# Patient Record
Sex: FEMALE | Race: WHITE | URBAN - METROPOLITAN AREA
[De-identification: names, ages, dates, MRNs, and addresses within clinical notes are randomized per-mention and may not be internally consistent; named-entity substitution may affect disease eponyms.]

---

## 2018-11-12 ENCOUNTER — ANESTHESIA EVENT (OUTPATIENT)
Dept: ENDOSCOPY | Age: 28
End: 2018-11-12
Payer: COMMERCIAL

## 2018-11-29 ENCOUNTER — HOSPITAL ENCOUNTER (OUTPATIENT)
Age: 28
Setting detail: OUTPATIENT SURGERY
Discharge: HOME | End: 2018-11-29
Attending: INTERNAL MEDICINE | Admitting: INTERNAL MEDICINE
Payer: COMMERCIAL

## 2018-11-29 ENCOUNTER — ANESTHESIA (OUTPATIENT)
Dept: ENDOSCOPY | Age: 28
End: 2018-11-29
Payer: COMMERCIAL

## 2018-11-29 VITALS
HEIGHT: 64 IN | HEART RATE: 67 BPM | OXYGEN SATURATION: 100 % | TEMPERATURE: 97.4 F | RESPIRATION RATE: 16 BRPM | DIASTOLIC BLOOD PRESSURE: 65 MMHG | BODY MASS INDEX: 29.02 KG/M2 | SYSTOLIC BLOOD PRESSURE: 108 MMHG | WEIGHT: 170 LBS

## 2018-11-29 VITALS — OXYGEN SATURATION: 100 % | DIASTOLIC BLOOD PRESSURE: 60 MMHG | SYSTOLIC BLOOD PRESSURE: 109 MMHG

## 2018-11-29 LAB — HCG(URINE) PREGNANCY TEST: NEGATIVE

## 2018-11-29 PROCEDURE — 3609009500 HC COLONOSCOPY DIAGNOSTIC OR SCREENING: Performed by: INTERNAL MEDICINE

## 2018-11-29 PROCEDURE — 2580000003 HC RX 258: Performed by: NURSE ANESTHETIST, CERTIFIED REGISTERED

## 2018-11-29 PROCEDURE — 3700000001 HC ADD 15 MINUTES (ANESTHESIA): Performed by: INTERNAL MEDICINE

## 2018-11-29 PROCEDURE — 2709999900 HC NON-CHARGEABLE SUPPLY: Performed by: INTERNAL MEDICINE

## 2018-11-29 PROCEDURE — 7100000010 HC PHASE II RECOVERY - FIRST 15 MIN: Performed by: INTERNAL MEDICINE

## 2018-11-29 PROCEDURE — 6360000002 HC RX W HCPCS: Performed by: NURSE ANESTHETIST, CERTIFIED REGISTERED

## 2018-11-29 PROCEDURE — 2500000003 HC RX 250 WO HCPCS: Performed by: NURSE ANESTHETIST, CERTIFIED REGISTERED

## 2018-11-29 PROCEDURE — 3700000000 HC ANESTHESIA ATTENDED CARE: Performed by: INTERNAL MEDICINE

## 2018-11-29 PROCEDURE — 2580000003 HC RX 258: Performed by: FAMILY MEDICINE

## 2018-11-29 PROCEDURE — 84703 CHORIONIC GONADOTROPIN ASSAY: CPT

## 2018-11-29 PROCEDURE — 7100000011 HC PHASE II RECOVERY - ADDTL 15 MIN: Performed by: INTERNAL MEDICINE

## 2018-11-29 RX ORDER — PROPOFOL 10 MG/ML
INJECTION, EMULSION INTRAVENOUS PRN
Status: DISCONTINUED | OUTPATIENT
Start: 2018-11-29 | End: 2018-11-29 | Stop reason: SDUPTHER

## 2018-11-29 RX ORDER — SODIUM CHLORIDE 0.9 % (FLUSH) 0.9 %
10 SYRINGE (ML) INJECTION EVERY 12 HOURS SCHEDULED
Status: DISCONTINUED | OUTPATIENT
Start: 2018-11-29 | End: 2018-11-29 | Stop reason: HOSPADM

## 2018-11-29 RX ORDER — LIDOCAINE HYDROCHLORIDE 20 MG/ML
INJECTION, SOLUTION INFILTRATION; PERINEURAL PRN
Status: DISCONTINUED | OUTPATIENT
Start: 2018-11-29 | End: 2018-11-29 | Stop reason: SDUPTHER

## 2018-11-29 RX ORDER — SODIUM CHLORIDE 9 MG/ML
INJECTION, SOLUTION INTRAVENOUS CONTINUOUS
Status: DISCONTINUED | OUTPATIENT
Start: 2018-11-29 | End: 2018-11-29 | Stop reason: HOSPADM

## 2018-11-29 RX ORDER — SODIUM CHLORIDE 9 MG/ML
INJECTION, SOLUTION INTRAVENOUS CONTINUOUS PRN
Status: DISCONTINUED | OUTPATIENT
Start: 2018-11-29 | End: 2018-11-29 | Stop reason: SDUPTHER

## 2018-11-29 RX ORDER — SODIUM CHLORIDE 0.9 % (FLUSH) 0.9 %
10 SYRINGE (ML) INJECTION PRN
Status: DISCONTINUED | OUTPATIENT
Start: 2018-11-29 | End: 2018-11-29 | Stop reason: HOSPADM

## 2018-11-29 RX ADMIN — SODIUM CHLORIDE: 9 INJECTION, SOLUTION INTRAVENOUS at 08:48

## 2018-11-29 RX ADMIN — PROPOFOL 30 MG: 10 INJECTION, EMULSION INTRAVENOUS at 09:14

## 2018-11-29 RX ADMIN — PROPOFOL 30 MG: 10 INJECTION, EMULSION INTRAVENOUS at 09:12

## 2018-11-29 RX ADMIN — PROPOFOL 20 MG: 10 INJECTION, EMULSION INTRAVENOUS at 09:19

## 2018-11-29 RX ADMIN — PROPOFOL 20 MG: 10 INJECTION, EMULSION INTRAVENOUS at 09:09

## 2018-11-29 RX ADMIN — SODIUM CHLORIDE: 9 INJECTION, SOLUTION INTRAVENOUS at 09:00

## 2018-11-29 RX ADMIN — PROPOFOL 20 MG: 10 INJECTION, EMULSION INTRAVENOUS at 09:15

## 2018-11-29 RX ADMIN — PROPOFOL 30 MG: 10 INJECTION, EMULSION INTRAVENOUS at 09:10

## 2018-11-29 RX ADMIN — PROPOFOL 30 MG: 10 INJECTION, EMULSION INTRAVENOUS at 09:17

## 2018-11-29 RX ADMIN — LIDOCAINE HYDROCHLORIDE 100 MG: 20 INJECTION, SOLUTION INFILTRATION; PERINEURAL at 09:09

## 2018-11-29 ASSESSMENT — PAIN - FUNCTIONAL ASSESSMENT: PAIN_FUNCTIONAL_ASSESSMENT: 0-10

## 2018-11-29 ASSESSMENT — PAIN SCALES - GENERAL
PAINLEVEL_OUTOF10: 0

## 2018-11-29 NOTE — ANESTHESIA POSTPROCEDURE EVALUATION
Department of Anesthesiology  Postprocedure Note    Patient: Chris Rios  MRN: [id number]  YOB: 1990  Date of evaluation: 11/29/2018  Time:  2:49 PM     Procedure Summary     Date:  11/29/18 Room / Location:  [facility] ENDO 03 / [facility] ENDOSCOPY    Anesthesia Start:  9887 Anesthesia Stop:  5460    Procedure:  COLONOSCOPY (N/A ) Diagnosis:  (HISTORY OF RECTAL CANCER Z85.048)    Surgeon:  Sophia Gill MD Responsible Provider:  Ella Monson MD    Anesthesia Type:  MAC ASA Status:  1          Anesthesia Type: MAC    Villa Phase I: Villa Score: 10    Villa Phase II: Villa Score: 10    Last vitals: Reviewed and per EMR flowsheets.        Anesthesia Post Evaluation    Patient location during evaluation: PACU  Complications: no  Cardiovascular status: hemodynamically stable  Respiratory status: acceptable

## 2018-11-29 NOTE — PROGRESS NOTES
Reviewed patient's medical and surgical history in electronic record and with patient at the bedside. All questions regarding procedure answered. Scope number and equipment verified using a two person system. Family in waiting room.     Electronically signed by Rosetta Coyne RN on 11/29/2018 at 9:07 AM

## 2021-07-02 ENCOUNTER — HOSPITAL ENCOUNTER (OUTPATIENT)
Age: 31
Discharge: HOME | End: 2021-07-02
Payer: COMMERCIAL

## 2021-07-02 ENCOUNTER — HOSPITAL ENCOUNTER (OUTPATIENT)
Dept: GENERAL RADIOLOGY | Age: 31
Discharge: HOME | End: 2021-07-02
Payer: COMMERCIAL

## 2021-07-02 DIAGNOSIS — R52 PAIN: ICD-10-CM

## 2021-07-02 PROCEDURE — 73140 X-RAY EXAM OF FINGER(S): CPT

## 2023-01-31 ENCOUNTER — ANESTHESIA EVENT (OUTPATIENT)
Dept: ENDOSCOPY | Age: 33
End: 2023-01-31
Payer: COMMERCIAL

## 2023-01-31 NOTE — PROGRESS NOTES
ENDOSCOPY PREOP INSTRUCTIONS      You are scheduled for a procedure at [facility] on 2-1 @ 815. You will need to arrival by: [phone] (at least an hour & a half prior to planned start time)  Report to the MAIN entrance on [address] and register at the surgery center on the left-hand side of the lobby  You will need your insurance card and photo id and a list of all medications taken on a regular basis. Please include the dose/frequency. For your procedure:     PLEASE FOLLOW ALL INSTRUCTIONS & PREPS GIVEN TO YOU BY YOUR DOCTOR'S OFFICE. If you have not received these instructions yet, please call the office immediately. Make sure to read them as soon as received. If you are taking blood thinners, Aspirin or diabetic medication, make sure to call your doctor as soon as possible for instructions prior to your procedure. Please dress comfortably and do not wear any lotion, powders or jewelry  Arrange for someone to be with you and sign you out & drive you home after your procedure. THIS PERSON MUST WAIT AT HOSPITAL THE ENTIRE TIME. We allow 2 adult visitors with you in the hospital & masks are strongly recommended.     WOMEN ONLY OF CHILDBEARING AGE: Please make sure to be able to give a urine sample on arrival      If you have further questions, you may contact your Endoscopist's office or Pre Admission Testing staff at [phone]

## 2023-02-01 ENCOUNTER — ANESTHESIA (OUTPATIENT)
Dept: ENDOSCOPY | Age: 33
End: 2023-02-01
Payer: COMMERCIAL

## 2023-02-01 ENCOUNTER — HOSPITAL ENCOUNTER (OUTPATIENT)
Age: 33
Setting detail: OUTPATIENT SURGERY
Discharge: HOME | End: 2023-02-01
Attending: INTERNAL MEDICINE | Admitting: INTERNAL MEDICINE
Payer: COMMERCIAL

## 2023-02-01 VITALS
OXYGEN SATURATION: 100 % | TEMPERATURE: 98.5 F | WEIGHT: 205 LBS | SYSTOLIC BLOOD PRESSURE: 104 MMHG | HEART RATE: 71 BPM | BODY MASS INDEX: 34.16 KG/M2 | RESPIRATION RATE: 18 BRPM | HEIGHT: 65 IN | DIASTOLIC BLOOD PRESSURE: 67 MMHG

## 2023-02-01 DIAGNOSIS — Z85.038 PERSONAL HISTORY OF COLON CANCER: ICD-10-CM

## 2023-02-01 DIAGNOSIS — K62.5 RECTAL BLEED: ICD-10-CM

## 2023-02-01 LAB — PREGNANCY, URINE: NEGATIVE

## 2023-02-01 PROCEDURE — 7100000010 HC PHASE II RECOVERY - FIRST 15 MIN: Performed by: INTERNAL MEDICINE

## 2023-02-01 PROCEDURE — 2580000003 HC RX 258: Performed by: ANESTHESIOLOGY

## 2023-02-01 PROCEDURE — 2709999900 HC NON-CHARGEABLE SUPPLY: Performed by: INTERNAL MEDICINE

## 2023-02-01 PROCEDURE — 3609010600 HC COLONOSCOPY POLYPECTOMY SNARE/COLD BIOPSY: Performed by: INTERNAL MEDICINE

## 2023-02-01 PROCEDURE — 2500000003 HC RX 250 WO HCPCS: Performed by: NURSE ANESTHETIST, CERTIFIED REGISTERED

## 2023-02-01 PROCEDURE — 3700000000 HC ANESTHESIA ATTENDED CARE: Performed by: INTERNAL MEDICINE

## 2023-02-01 PROCEDURE — 88305 TISSUE EXAM BY PATHOLOGIST: CPT

## 2023-02-01 PROCEDURE — 6360000002 HC RX W HCPCS: Performed by: NURSE ANESTHETIST, CERTIFIED REGISTERED

## 2023-02-01 PROCEDURE — 7100000011 HC PHASE II RECOVERY - ADDTL 15 MIN: Performed by: INTERNAL MEDICINE

## 2023-02-01 PROCEDURE — 3700000001 HC ADD 15 MINUTES (ANESTHESIA): Performed by: INTERNAL MEDICINE

## 2023-02-01 PROCEDURE — 84703 CHORIONIC GONADOTROPIN ASSAY: CPT

## 2023-02-01 RX ORDER — PROPOFOL 10 MG/ML
INJECTION, EMULSION INTRAVENOUS CONTINUOUS PRN
Status: DISCONTINUED | OUTPATIENT
Start: 2023-02-01 | End: 2023-02-01 | Stop reason: SDUPTHER

## 2023-02-01 RX ORDER — LIDOCAINE HYDROCHLORIDE 20 MG/ML
INJECTION, SOLUTION INFILTRATION; PERINEURAL PRN
Status: DISCONTINUED | OUTPATIENT
Start: 2023-02-01 | End: 2023-02-01 | Stop reason: SDUPTHER

## 2023-02-01 RX ORDER — SODIUM CHLORIDE, SODIUM LACTATE, POTASSIUM CHLORIDE, CALCIUM CHLORIDE 600; 310; 30; 20 MG/100ML; MG/100ML; MG/100ML; MG/100ML
INJECTION, SOLUTION INTRAVENOUS CONTINUOUS
Status: DISCONTINUED | OUTPATIENT
Start: 2023-02-01 | End: 2023-02-01 | Stop reason: HOSPADM

## 2023-02-01 RX ORDER — PROPOFOL 10 MG/ML
INJECTION, EMULSION INTRAVENOUS PRN
Status: DISCONTINUED | OUTPATIENT
Start: 2023-02-01 | End: 2023-02-01 | Stop reason: SDUPTHER

## 2023-02-01 RX ADMIN — LIDOCAINE HYDROCHLORIDE 100 MG: 20 INJECTION, SOLUTION INFILTRATION; PERINEURAL at 08:34

## 2023-02-01 RX ADMIN — SODIUM CHLORIDE, SODIUM LACTATE, POTASSIUM CHLORIDE, AND CALCIUM CHLORIDE: .6; .31; .03; .02 INJECTION, SOLUTION INTRAVENOUS at 08:03

## 2023-02-01 RX ADMIN — PROPOFOL 50 MG: 10 INJECTION, EMULSION INTRAVENOUS at 08:35

## 2023-02-01 RX ADMIN — PROPOFOL 200 MCG/KG/MIN: 10 INJECTION, EMULSION INTRAVENOUS at 08:35

## 2023-02-01 RX ADMIN — PROPOFOL 50 MG: 10 INJECTION, EMULSION INTRAVENOUS at 08:34

## 2023-02-01 ASSESSMENT — PAIN - FUNCTIONAL ASSESSMENT: PAIN_FUNCTIONAL_ASSESSMENT: 0-10

## 2023-02-01 ASSESSMENT — PAIN SCALES - GENERAL
PAINLEVEL_OUTOF10: 0
PAINLEVEL_OUTOF10: 0

## 2023-02-01 NOTE — H&P
Pre-operative History and Physical    Patient: Henrietta Calderon  : 1990  Acct#:     History Obtained From:  patient    HISTORY OF PRESENT ILLNESS:    The patient is a 29 y.o. female  who presents with previous colon cancer    Past Medical History:        Diagnosis Date    Constipation     Herpes     Panic attack     Rectal cancer ([facility] [address]      Past Surgical History:        Procedure Laterality Date     SECTION      COLECTOMY      MI COLONOSCOPY FLX DX W/COLLJ SPEC WHEN PFRMD N/A 2018    COLONOSCOPY performed by Skip Gastelum MD at [address] [place] [address]     Medications Prior to Admission:   No current facility-administered medications on file prior to encounter. Current Outpatient Medications on File Prior to Encounter   Medication Sig Dispense Refill    ValACYclovir HCl (VALTREX PO) Take by mouth daily (Patient not taking: Reported on 2023)          Allergies:  Patient has no known allergies. History of allergic reaction to anesthesia:  No    PHYSICAL EXAM:      /68   Pulse 84   Temp 97.7 °F (36.5 °C) (Temporal)   Resp 15   Ht 5' 4.5\" (1.638 m)   Wt 205 lb (93 kg)   LMP 2023   SpO2 98%   BMI 34.64 kg/m²  I        Heart:  Normal apical impulse, regular rate and rhythm, normal S1 and S2, no S3 or S4, and no murmur noted    Lungs:  No increased work of breathing, good air exchange, clear to auscultation bilaterally, no crackles or wheezing    Abdomen:  No scars, normal bowel sounds, soft, non-distended, non-tender, no masses palpated, no hepatosplenomegally      ASA Grade:  ASA 2 - Patient with mild systemic disease with no functional limitations      ASSESSMENT AND PLAN:    1. Patient is a 29 y.o. female here for colonoscopy with deep sedation  2. Procedure options, risks and benefits reviewed with patient. Patient expresses understanding.             Dorys Mason MD  [address]  [phone]

## 2023-02-01 NOTE — PROGRESS NOTES
Ambulatory Surgery/Procedure Discharge Note    Vitals:    02/01/23 0909   BP: 104/67   Pulse: 71   Resp: 18   Temp:    SpO2: 100%       In: 400 [I.V.:400]  Out: -     Restroom use offered before discharge. Yes    Pain assessment:  none  Pain Level: 0        Patient discharged to home/self care.  Patient discharged via wheel chair by transporter to waiting family/S.O.       2/1/2023 9:30 AM

## 2023-02-01 NOTE — PROCEDURES
Colonoscopy Procedure Note      Patient: Jacques Manrique  : 1990  Acct#:     Procedure: Colonoscopy with polypectomy (cold snare)    Date:  2023    Surgeon:  Charles Ramirez MD,     Referring Physician:  Victoria Wiley      Preoperative Diagnosis:    H/O rectosigmoid adenocarcinoma in 2012, s/p LAR, T1N0M0  For surveillance colonoscopy today. She has history of occasional blood in stool and history of constipation  As per her, She has had genetic testing/counseling which was negative      Consent:  The patient or their legal guardian has signed a consent, and is aware of the potential risks, benefits, alternatives, and potential complications of this procedure. These include, but are not limited to hemorrhage, bleeding, post procedural pain, perforation, phlebitis, aspiration, hypotension, hypoxia, cardiovascular events such as arryhthmia, and possibly death. Additionally, the possibility of missed colonic polyps and interval colon cancer was discussed in the consent. Anesthesia: MAC anesthesia        Procedure description:   An informed consent was obtained from the patient after explanation of indications, benefits, possible risks and complications of the procedure. The patient was then taken to the endoscopy suite, placed in the left lateral decubitus position, and the above IV anesthesia was administered. A digital rectal examination was performed and revealed negative without mass, lesions or tenderness. The Olympus video colonoscope was placed in the patient's rectum under digital direction and advanced to the cecum. The cecum was identified by IC valve and appendiceal orifice. The prep was good. The ileocecal valve was identified and  intubated. The scope was then withdrawn back through the cecum, ascending, transverse, descending and sigmoid colons. Careful circumferential examination of the mucosa was performed.  The scope was then withdrawn into the rectum and retroflexed. The scope was straightened, the colon was decompressed and the scope was withdrawn from the patient. The patient tolerated the procedure well and was taken to the recovery room in good condition. Complications: none  EBL: None    Findings:  Visualization of the terminal ileum demonstrated normal mucosa. The mucosa in cecum, ascending colon, transverse colon, descending colon, sigmoid colon and rectum was normal.  5 mm polyp was seen at the colorectal anastomosis site, at 10 cm from the anal verge, removed by cold snare polypectomy. Retroflexion in the rectum revealed small internal hemorrhoids. Impression:    5 mm polyp was seen at the colorectal anastomosis site, at 10 cm from the anal verge, removed by cold snare polypectomy. Normal mucosa seen otherwise throughout the exam up to terminal ileum      Recommendations: Follow-up pathology results  Recommend surveillance colonoscopy in 3 years, if the pathology is benign  Occasional blood in stool most likely secondary to small hemorrhoids.   Recommend Metamucil/fiber supplements daily and MiraLAX daily as needed for constipation  Follow-up in the office as needed        Edward Pettit, [address] [place] [address]  [phone]    2/1/2023

## 2023-02-01 NOTE — ANESTHESIA POSTPROCEDURE EVALUATION
Department of Anesthesiology  Postprocedure Note    Patient: Gamal Chaves  MRN: [id number]  YOB: 1990  Date of evaluation: 2/1/2023      Procedure Summary     Date: 02/01/23 Room / Location: [address] [place] [address]    Anesthesia Start: [address] Anesthesia Stop: 1785    Procedure: COLONOSCOPY POLYPECTOMY SNARE/COLD BIOPSY Diagnosis:       Rectal bleed      Personal history of colon cancer      (Rectal bleed [K62.5])      (Personal history of colon cancer [Z85.038])    Surgeons: Eliane Centeno MD Responsible Provider: Yaneli Kolb MD    Anesthesia Type: MAC ASA Status: 3          Anesthesia Type: No value filed.     Villa Phase I: Villa Score: 10    Villa Phase II: Villa Score: 10      Anesthesia Post Evaluation    Patient location during evaluation: PACU  Patient participation: complete - patient participated  Level of consciousness: awake  Pain score: 0  Nausea & Vomiting: no nausea and no vomiting  Complications: no  Cardiovascular status: blood pressure returned to baseline  Respiratory status: acceptable  Hydration status: euvolemic

## 2023-02-01 NOTE — DISCHARGE INSTRUCTIONS
ENDOSCOPY DISCHARGE INSTRUCTIONS:    Call the physician that did your procedure for any questions or concern:    [facility]: [phone]  DR. SHEN [facility]        ACTIVITY:    There are potential side effects to the medications used for sedation and anesthesia during your procedure. These include:  Dizziness or light-headedness, confusion or memory loss, delayed reaction times, loss of coordination, nausea and vomiting. Because of your increased risk for injury, we ask that you observe the following precautions: For the next 24 hours,  DO NOT operate an automobile, bicycle, motorcycle, , power tools or large equipment of any kind. Do not drink alcohol, sign any legal documents or make any legal decisions for 24 hours. Do not bend your head over lower than your heart. DO sit on the side of bed/couch awhile before getting up. Plan on bedrest or quiet relaxation today. You may resume normal activities in 24 hours. DIET:    Your first meal today should be light, avoiding spicy and fatty foods. If you tolerate this first meal, then you may advance to your regular diet unless otherwise advised by your physician. NORMAL SYMPTOMS:  -Mild sore throat if youve had an EGD   -Gaseous discomfort    NOTIFY YOUR PHYSICIAN IF THESE SYMPTOMS OCCUR:  1. Fever (greater than 100)  5. Increased abdominal bloating  2. Severe pain    6. Excessive bleeding  3. Nausea and vomiting  7. Chest pain                                                                    4. Chills    8. Shortness of breath    ADDITIONAL INSTRUCTIONS:    Biopsy results: Call [address],[facility] for biopsy results in 1 week    Educational Information:         Colon Polyps: Care Instructions  Your Care Instructions     Colon polyps are growths in the colon or the rectum. The cause of most colon polyps is not known, and most people who get them do not have any problems. But a certain kind can turn into cancer.  For this reason, regular testing for colon polyps is important for people as they get older. It is also important for anyone who has an increased risk for colon cancer. Polyps are usually found through routine colon cancer screening tests. Although most colon polyps are not cancerous, they are usually removed and then tested for cancer. Screening for colon cancer saves lives because the cancer can usually be cured if it is caught early. If you have a polyp that is the type that can turn into cancer, you may need more tests to examine your entire colon. The doctor will remove any other polyps that are found, and you will be tested more often. Follow-up care is a key part of your treatment and safety. Be sure to make and go to all appointments, and call your doctor if you are having problems. It's also a good idea to know your test results and keep a list of the medicines you take. How can you care for yourself at home? Regular exams to look for colon polyps are the best way to prevent polyps from turning into colon cancer. These can include stool tests, sigmoidoscopy, colonoscopy, and CT colonography. Talk with your doctor about a testing schedule that is right for you. To prevent polyps  There is no home treatment that can prevent colon polyps. But these steps may help lower your risk for cancer. Stay active. Being active can help you get to and stay at a healthy weight. Try to exercise on most days of the week. Walking is a good choice. Eat well. Choose a variety of vegetables, fruits, legumes (such as peas and beans), fish, poultry, and whole grains. Do not smoke. If you need help quitting, talk to your doctor about stop-smoking programs and medicines. These can increase your chances of quitting for good. If you drink alcohol, limit how much you drink. Limit alcohol to 2 drinks a day for men and 1 drink a day for women. When should you call for help?    Call your doctor now or seek immediate medical care if:    You have severe belly pain.     Your stools are maroon or very bloody. Watch closely for changes in your health, and be sure to contact your doctor if:    You have a fever. You have nausea or vomiting. You have a change in bowel habits (new constipation or diarrhea). Your symptoms get worse or are not improving as expected. Where can you learn more? Go to http://www.woods.com/ and enter C571 to learn more about \"Colon Polyps: Care Instructions. \"  Current as of: June 6, 2022               Content Version: 13.5  © 6198-9284 [employer]. Care instructions adapted under license by [facility] [address] If you have questions about a medical condition or this instruction, always ask your healthcare professional. [place] [address] any warranty or liability for your use of this information. Impression:    5 mm polyp was seen at the colorectal anastomosis site, at 10 cm from the anal verge, removed by cold snare polypectomy. Normal mucosa seen otherwise throughout the exam up to terminal ileum        Recommendations: Follow-up pathology results  Recommend surveillance colonoscopy in 3 years, if the pathology is benign  Occasional blood in stool most likely secondary to small hemorrhoids. Recommend Metamucil/fiber supplements daily and MiraLAX daily as needed for constipation  Follow-up in the office as needed      Please review these discharge instructions this evening or tomorrow for  information you may have forgotten. We want to thank you for choosing the [facility] as your health care provider. We always strive to provide you with excellent care while you are here. You may receive a survey in the mail regarding your care. We would appreciate you taking a few minutes of your time to complete this survey.

## 2023-02-01 NOTE — ANESTHESIA PRE PROCEDURE
Department of Anesthesiology  Preprocedure Note       Name:  Juan Zheng   Age:  29 y.o.  :  1990                                          MRN:  [id number]         Date:  2023      Surgeon: Fatuma Allen):  Gavin Pacheco MD    Procedure: Procedure(s):  COLONOSCOPY    Medications prior to admission:   Prior to Admission medications    Medication Sig Start Date End Date Taking? Authorizing Provider   ValACYclovir HCl (VALTREX PO) Take by mouth daily  Patient not taking: Reported on 2023    Historical Provider, MD       Current medications:    Current Facility-Administered Medications   Medication Dose Route Frequency Provider Last Rate Last Admin    lactated ringers IV soln infusion   IntraVENous Continuous Brian Toscano MD           Allergies:  No Known Allergies    Problem List:  There is no problem list on file for this patient. Past Medical History:        Diagnosis Date    Constipation     Herpes     Panic attack     Rectal cancer [facility])        Past Surgical History:        Procedure Laterality Date     SECTION      COLECTOMY      [place] COLONOSCOPY FLX DX W/COLLJ SPEC WHEN PFRMD N/A 2018    COLONOSCOPY performed by Ponce Garcia MD at [address] [facility] History:    Social History     Tobacco Use    Smoking status: Never    Smokeless tobacco: Never   Substance Use Topics    Alcohol use:  No                                Counseling given: Not Answered      Vital Signs (Current):   Vitals:    23 0706   BP: 117/68   Pulse: 84   Resp: 15   Temp: 97.7 °F (36.5 °C)   TempSrc: Temporal   SpO2: 98%   Weight: 205 lb (93 kg)   Height: 5' 4.5\" (1.638 m)                                              BP Readings from Last 3 Encounters:   23 117/68   18 109/60   18 108/65       NPO Status: Time of last liquid consumption: 0200                        Time of last solid consumption: 2300                        Date of last liquid consumption: 02/01/23                        Date of last solid food consumption: 01/30/23    BMI:   Wt Readings from Last 3 Encounters:   02/01/23 205 lb (93 kg)   11/29/18 170 lb (77.1 kg)   04/09/16 195 lb (88.5 kg)     Body mass index is 34.64 kg/m². CBC:   Lab Results   Component Value Date/Time    WBC 6.6 10/11/2010 12:06 PM    RBC 4.50 10/11/2010 12:06 PM    HGB 12.6 09/16/2011 03:49 PM    HGB 13.1 10/11/2010 12:06 PM    HCT 37 09/16/2011 03:49 PM    HCT 38.5 10/11/2010 12:06 PM    MCV 85.6 10/11/2010 12:06 PM    RDW 13.1 10/11/2010 12:06 PM     10/11/2010 12:06 PM       CMP:   Lab Results   Component Value Date/Time     10/11/2010 12:06 PM    K 4.1 10/11/2010 12:06 PM     10/11/2010 12:06 PM    CO2 28 10/11/2010 12:06 PM    BUN 10 10/11/2010 12:06 PM    CREATININE 0.6 10/11/2010 12:06 PM    GFRAA >60 10/11/2010 12:06 PM    AGRATIO 1.9 10/11/2010 12:06 PM    GLUCOSE 86 10/11/2010 12:06 PM    PROT 6.9 10/11/2010 12:06 PM    CALCIUM 9.4 10/11/2010 12:06 PM    BILITOT 0.80 10/11/2010 12:06 PM    ALKPHOS 81 10/11/2010 12:06 PM    AST 25 10/11/2010 12:06 PM    ALT 31 10/11/2010 12:06 PM       POC Tests: No results for input(s): POCGLU, POCNA, POCK, POCCL, POCBUN, POCHEMO, POCHCT in the last 72 hours.     Coags: No results found for: PROTIME, INR, APTT    HCG (If Applicable):   Lab Results   Component Value Date    PREGTESTUR Negative 02/01/2023        ABGs: No results found for: PHART, PO2ART, ISA4TGM, NSX1QLD, BEART, C1IEVTDZ     Type & Screen (If Applicable):  No results found for: LABABO, LABRH    Drug/Infectious Status (If Applicable):  No results found for: HIV, HEPCAB    COVID-19 Screening (If Applicable): No results found for: COVID19        Anesthesia Evaluation  Patient summary reviewed and Nursing notes reviewed  Airway: Mallampati: II  TM distance: >3 FB   Neck ROM: full  Mouth opening: > = 3 FB   Dental:          Pulmonary: breath sounds clear to auscultation Cardiovascular:            Rhythm: regular  Rate: normal                    Neuro/Psych:               GI/Hepatic/Renal:            ROS comment: Obesity, rectal CA. Endo/Other:                      ROS comment: Ankle fracture Abdominal:   (+) obese,           Vascular: Other Findings:           Anesthesia Plan      MAC     ASA 3       Induction: intravenous. Anesthetic plan and risks discussed with patient. Use of blood products discussed with patient whom. Plan discussed with CRNA.                     Chidi Tee MD   2/1/2023

## 2023-03-07 ENCOUNTER — HOSPITAL ENCOUNTER (OUTPATIENT)
Dept: PHYSICAL THERAPY | Age: 33
Setting detail: THERAPIES SERIES
Discharge: HOME | End: 2023-03-07
Payer: COMMERCIAL

## 2023-03-07 PROCEDURE — 97530 THERAPEUTIC ACTIVITIES: CPT

## 2023-03-07 PROCEDURE — 97161 PT EVAL LOW COMPLEX 20 MIN: CPT

## 2023-03-07 PROCEDURE — 97110 THERAPEUTIC EXERCISES: CPT

## 2023-03-07 NOTE — PLAN OF CARE
[address] [address], [address] [place] [address] Drive  Phone: [phone]   Fax: [phone]                                                       Physical Therapy Certification    Dear Gregory Sánchez MD  ,    We had the pleasure of evaluating the following patient for physical therapy services at [address] [place] [address]. A summary of our findings can be found in the initial assessment below. This includes our plan of care. If you have any questions or concerns regarding these findings, please do not hesitate to contact me at the office phone number checked above. Thank you for the referral.       Physician Signature:_______________________________Date:__________________  By signing above (or electronic signature), therapists plan is approved by physician      Patient: Sunitha Doyle   : 1990   MRN: [id number]  Referring Physician: Gregory Sánchez MD        Evaluation Date: 3/7/2023      Medical Diagnosis Information:  F66.166J (ICD-10-CM) - Displaced trimalleolar fracture of right lower leg, subsequent encounter for open fracture type I or II with routine healing   PT diagnosis: decreased R ankle ROM, strength, antalgic gait                                      Insurance information: PT Insurance Information: [employer]     Precautions/ Contra-indications: n/a  Latex Allergy:  [x]NO      []YES  Preferred Language for Healthcare:   [x]English       []Other:    C-SSRS Triggered by Intake questionnaire (Past 2 wk assessment ):   [x] No, Questionnaire did not trigger screening.   [] Yes, Patient intake triggered C-SSRS Screening     [] Completed, no further action required. [] Completed, PCP notified via Epic    SUBJECTIVE: Patient stated complaint:pt was walking into her aunt's house on [address]  She was in the driveway but had to step in between driveway and grass and states the rest is a blur. Pt had surgery on 1/2/23. Since surgery, she has had PT. In Feb she had therapy, but decided to transfer here. Two weeks ago at her last appt she started putting weight through her R foot. She has only put shoe on once at PT, not wearing it at home. Pt has increased pain when wearing shoe compared to boot. Dropped to one crutch last week. Pt able to walk around at home more now that she has less swelling and less fear about a re-fracture. Pt went into basement for the first time. Pt went to the mall yesterday, but had to take seated rest breaks because of pain. Has a lot of night pain when she walks more. Has been taking OTC pain meds at night d/t pain. Pt used to be in the gym to workout a lot about a year ago but has not been since the injury.      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   Currently in nursing school - was going to graduate in July but is not attending this current semester  May not return to [employer] (formerly [employer]), but will go back to school in May at "[employer] where she has clinicals    Living Status/Prior Level of Function:Prior to this injury / incident, pt was independent with ADLs and IADLs,       OBJECTIVE:   Palpation: generally tender to palpation and towards available end range    Functional Mobility/Transfers: mod IND    Posture: WFL    Bandages/Dressings/Incisions: incisions are clean, dry, and healed    Gait: (include devices/WB status) w/ aircast boot and LUE crutch    Dermatomes Normal Abnormal Comments   inguinal area (L1)       anterior mid-thigh (L2)      distal ant thigh/med knee (L3)      medial lower leg and foot (L4)      lateral lower leg and foot (L5)      posterior calf (S1)      medial calcaneus (S2)          Reflexes Normal Abnormal Comments   S1-2 Seated achilles      S1-2 Prone knee bend      L3-4 Patellar tendon      Clonus      Babinski        Lumbar AROM screen: [x] [facility]  [] abnormal:     PROM AROM    L R L R   Hip Flexion       Hip Abduction       Hip ER       Hip IR       Knee Flexion       Knee Extension       Dorsiflexion    1 -3   Plantarflexion    61 50   Inversion    33 30   Eversion    20 8       Strength (0-5) / Myotomes Left Right   Hip Flexion - supine     Hip Flexion - seated (L1-2) 5 5   Hip Abduction     Hip Adduction     Hip ER     Hip IR     Quads (L2-4) 5 5   Hamstrings 5 4+**   Ankle Dorsiflexion (L4-5) 5 4   Ankle Plantarflexion (S1-2)  SL HR 5  20 4  NT   Ankle Inversion 5 4**   Ankle Eversion (S1-2) 5 4-**   Great Toe Extension (L5)          Flexibility     Hamstrings (90/90)     ITB (Efrain)     Quads (Ely's)     Hip Flexor (Roddy)          Girth (cm)     Mid patella     Suprapatellar     Figure 8 51.8 54.5   Transmalleolar 25.5 27.5   Metatarsal Heads         Joint mobility: R ankle   []Normal    [x]Hypo   []Hyper    Orthopaedic Special Tests - NT d/t sx Positive  Negative  NT Comments    Hip       SAE / Hero's       FADIR       Scour       Trendelenburg              Knee       Lachman's / Anterior Drawer       Posterior Drawer       Varus Stress       Valgus Stress       Bautista's        Appley's       Thessaly's       Patellar Tracking              Ankle       Anterior Drawer       Talar Tilt       Moore       Chey's                   Balance:   SLS: barefoot  L: 32 seconds  R: NT                         [x] Patient history, allergies, meds reviewed. Medical chart reviewed. See intake form.      Review Of Systems (ROS):  [x]Performed Review of systems (Integumentary, CardioPulmonary, Neurological) by intake and observation. Intake form has been scanned into medical record. Patient has been instructed to contact their primary care physician regarding ROS issues if not already being addressed at this time.       Co-morbidities/Complexities (which will affect course of rehabilitation):   []None        [x]Hx of COVID   Arthritic conditions   []Rheumatoid arthritis (M05.9)  []Osteoarthritis (M19.91)  []Gout   Cardiovascular conditions   []Hypertension (I10)  []Hyperlipidemia (E78.5)  []Angina pectoris (I20)  []Atherosclerosis (I70)  []Pacemaker  []Hx of CABG/stent/  cardiac surgeries   Musculoskeletal conditions   []Disc pathology   []Congenital spine pathologies   []Osteoporosis (M81.8)  []Osteopenia (M85.8)  []Scoliosis       Endocrine conditions   []Hypothyroid (E03.9)  []Hyperthyroid Gastrointestinal conditions   []Constipation (V91.20)   Metabolic conditions   []Morbid obesity (E66.01)  []Diabetes type 1(E10.65) or 2 (E11.65)   []Neuropathy (G60.9)     Cardio/Pulmonary conditions   []Asthma (J45)  []Coughing   []COPD (J44.9)  []CHF  []A-fib   Psychological Disorders  [x]Anxiety (F41.9)  []Depression (F32.9)   []Other:   Developmental Disorders  []Autism (F84.0)  []CP (G80)  []Down Syndrome (Q90.9)  []Developmental delay     Neurological conditions  []Prior Stroke (I69.30)  []Parkinson's (G20)  []Encephalopathy (G93.40)  []MS (G35)  []Post-polio (G14)  []SCI  []TBI  []ALS Other conditions  []Fibromyalgia (M79.7)  []Vertigo  []Syncope  []Kidney Failure  [x]Cancer      []currently undergoing                treatment  []Pregnancy  []Incontinence   Prior surgeries  []involved limb  []previous spinal surgery  [] section birth  []hysterectomy  []bowel / bladder surgery  []other relevant surgeries   []Other:              Barriers to/and or personal factors that will affect rehab potential:              []Age  []Sex    []Smoker              []Motivation/Lack of Motivation                        []Co-Morbidities []Cognitive Function, education/learning barriers              []Environmental, home barriers              [x]profession/work barriers  []past PT/medical experience  []other:  Justification:     Falls Risk Assessment (30 days):   [] Falls Risk assessed and no intervention required. [x] Falls Risk assessed and Patient requires intervention due to being higher risk   TUG score (>12s at risk):     [] Falls education provided, including        ASSESSMENT: Pt is a 28 y/o female s/p R ankle ORIF for trimalleolar fracture. Pt was seen post-op at another OP therapy clinic for about a month and then presents here. Pt with increased pain when ambulating without boot. Pt has been using crutch on LUE. Pt with decreased L ankle ROM, strength, balance, and endurance. Pt to benefit from further therapy to address deficits and restore function in R ankle to PLOF.    Functional Impairments:     [x]Noted lumbar/proximal hip/LE joint hypomobility   [x]Decreased LE functional ROM   [x]Decreased core/proximal hip strength and neuromuscular control   [x]Decreased LE functional strength   [x]Reduced balance/proprioceptive control   []other:      Functional Activity Limitations (from functional questionnaire and intake)   [x]Reduced ability to tolerate prolonged functional positions   [x]Reduced ability or difficulty with changes of positions or transfers between positions   [x]Reduced ability to maintain good posture and demonstrate good body mechanics with sitting, bending, and lifting   []Reduced ability to sleep   [x] Reduced ability or tolerance with driving and/or computer work   [x]Reduced ability to perform lifting, carrying tasks   [x]Reduced ability to squat   [x]Reduced ability to forward bend   [x]Reduced ability to ambulate prolonged functional periods/distances/surfaces   [x]Reduced ability to ascend/descend stairs   [x]Reduced ability to run, hop, cut or jump   []other:    Participation Restrictions   [x]Reduced participation in self care activities   [x]Reduced participation in home management activities   [x]Reduced participation in work activities   [x]Reduced participation in social activities. []Reduced participation in sport/recreation activities. Classification :    [x]Signs/symptoms consistent with post-surgical status including decreased ROM, strength and function.    []Signs/symptoms consistent with joint sprain/strain   []Signs/symptoms consistent with patella-femoral syndrome   []Signs/symptoms consistent with knee OA/hip OA   []Signs/symptoms consistent with internal derangement of knee/Hip   []Signs/symptoms consistent with functional hip weakness/NMR control      []Signs/symptoms consistent with tendinitis/tendinosis    []signs/symptoms consistent with pathology which may benefit from Dry needling      []other:      Prognosis/Rehab Potential:      [x]Excellent   []Good    []Fair   []Poor    Tolerance of evaluation/treatment:    []Excellent   [x]Good    []Fair   []Poor    Physical Therapy Evaluation Complexity Justification  [x] A history of present problem with:  [] no personal factors and/or comorbidities that impact the plan of care;  [x]1-2 personal factors and/or comorbidities that impact the plan of care  []3 personal factors and/or comorbidities that impact the plan of care  [x] An examination of body systems using standardized tests and measures addressing any of the following: body structures and functions (impairments), activity limitations, and/or participation restrictions;:  [x] a total of 1-2 or more elements   [] a total of 3 or more elements   [] a total of 4 or more elements   [x] A clinical presentation with:  [x] stable and/or uncomplicated characteristics   [] evolving clinical presentation with changing characteristics  [] unstable and unpredictable characteristics;   [x] Clinical decision making of [x] Low, [] moderate, [] high complexity using standardized patient assessment instrument and/or measurable assessment of functional outcome. [x] EVAL (LOW) 07526 (typically 15 minutes face-to-face)  [] EVAL (MOD) 58703 (typically 30 minutes face-to-face)  [] EVAL (HIGH) 63749 (typically 45 minutes face-to-face)  [] RE-EVAL     PLAN:   Frequency/Duration:  1-2 days per week for 10 Weeks:  Interventions:  [x]  Therapeutic exercise including: strength training, ROM, for Lower extremity and core   [x]  NMR activation and proprioception for LE, Glutes and Core   [x]  Manual therapy as indicated for LE, Hip and spine to include: Dry Needling/IASTM, STM, PROM, Gr I-IV mobilizations, manipulation. [x] Modalities as needed that may include: thermal agents, E-stim, Biofeedback, US, iontophoresis as indicated  [x] Patient education on joint protection, postural re-education, activity modification, progression of HEP. HEP instruction: Written HEP instructions provided and reviewed. GOALS:  Patient stated goal: \"gym, drive\"  [] Progressing: [] Met: [] Not Met: [] Adjusted    Therapist goals for Patient:   Short Term Goals: To be achieved in: 2 weeks  1. Independent in HEP and progression per patient tolerance, in order to prevent re-injury. [] Progressing: [] Met: [] Not Met: [] Adjusted  2. Patient will have a decrease in pain to facilitate improvement in movement, function, and ADLs as indicated by Functional Deficits. [] Progressing: [] Met: [] Not Met: [] Adjusted    Long Term Goals: To be achieved in: 10 weeks  1. Pt will improve LEFS by 9 points to reduce disability and progress towards PLOF. [] Progressing: [] Met: [] Not Met: [] Adjusted  2. Patient will demonstrate increased R ankle AROM to improve DF to 0, other ROM by 5 degrees to allow for proper joint functioning as indicated by patients Functional Deficits. [] Progressing: [] Met: [] Not Met: [] Adjusted  3.  Patient will demonstrate an increase in R ankle Strength to at least 5/5 as well as good proximal hip strength and control to allow for proper functional mobility as indicated by patients Functional Deficits. [] Progressing: [] Met: [] Not Met: [] Adjusted  4. Patient will return to functional activities including ascending/descending at least 10 stairs reciprocally with at least one handrail without increased symptoms or restriction. [] Progressing: [] Met: [] Not Met: [] Adjusted  5. Patient will be able to demonstrate improved balance by performing SLS on RLE for at least 20 seconds independently barefoot on floor.    [] Progressing: [] Met: [] Not Met: [] Adjusted     Electronically signed by:  Jj Huntley, PT, DPT

## 2023-03-07 NOTE — FLOWSHEET NOTE
[address] [place] [address]  Phone: [phone]   Fax: [phone]    Physical Therapy Daily Treatment Note    Date:  2023     Patient Name:  Rubin Palma    :  1990  MRN: [id number]  Medical Diagnosis:  N35.282Y (ICD-10-CM) - Displaced trimalleolar fracture of right lower leg, subsequent encounter for open fracture type I or II with routine healing  Treatment Diagnosis: decreased R ankle ROM, strength, antalgic gait     Insurance/Certification information:  PT Insurance Information: [facility]  Physician Information:  Mehran Figueroa MD    Plan of care signed (Y/N): []  Yes [x]  No     Date of Patient follow up with Physician:      Progress Report: []  Yes  [x]  No     Date Range for reporting period:  Beginning: 3/7/2023  Ending:     Progress report due (10 Rx/or 30 days whichever is less): visit #10 or 30 (date)     Recertification due (POC duration/ or 90 days whichever is less): visit #TBD or 23 (date)     Visit # Insurance Allowable Auth required?  Date Range   1/? 30/yr [x]  Yes  []  No 3/7/23-       Units approved Units used Date Range   - - -     Latex Allergy:  [x]NO      []YES  Preferred Language for Healthcare:   [x]English       []other:    Functional Scale:       Date assessed:  LEFS: raw score = 33; dysfunction = 59%  3/7/23    Pain level:  1-8/10     SUBJECTIVE:  See eval    OBJECTIVE: See eval      RESTRICTIONS/PRECAUTIONS: s/p R ankle ORIF for trimalleolar fx  Per pt, MD wants her out of boot as much as possible  Progressed from 2 to 1 crutch last week along with walking in tennis shoe at therapy    Exercises/Interventions: wants to be able to drive, use elliptical    Therapeutic Exercise (12858)  Resistance / level Sets/sec Reps Notes / Cues   bike       IB: gastroc/soleus       Ankle EV/INV w/ TB    HEP   Seated HR/TR    HEP   AROM INV/EV    HEP   Squats to chair       Leg Press       Quantum  -leg ext  -leg curl       Hip 3 way standing                     Gait (11581)       [facility] walking       Progress from crutch to IND                     Therapeutic Activities (55567)                                   Neuromuscular Re-ed (17705)       Alter G       tandem       NBOS       SLS                     Manual Intervention (68101)       Knee mobs/PROM       Tib/Fem Mobs       Patella Mobs       Ankle mobs                         Modalities:     Pt. Education:  03/07/2023  -pt educated on diagnosis, prognosis and expectations for rehab  -all pt questions were answered  -educated on LE machines to use at gym: leg ext, curl; hip abd/add, bike    Home Exercise Program:  Access Code: [email or web address]  URL: [email or web address]  Date: 03/07/2023  Prepared by: Saurav Henderson    Exercises  Long Sitting Soleus Stretch on Bolster with Strap - 1-4 x daily - 7 x weekly - 1 sets - 3-5 reps - 20-30 seconds hold  Long Sitting Calf Stretch with Strap - 1-4 x daily - 7 x weekly - 1 sets - 3-5 reps - 20-30 seconds hold  Long Sitting Ankle Eversion with Resistance - 1 x daily - 7 x weekly - 3 sets - 10 reps  Long Sitting Ankle Inversion with Resistance - 1 x daily - 7 x weekly - 3 sets - 10 reps  Seated Heel Raise - 1 x daily - 7 x weekly - 2-3 sets - 10 reps  Seated Toe Raise - 1 x daily - 7 x weekly - 2-3 sets - 10 reps  Ankle Inversion Eversion Towel Slide - 1 x daily - 7 x weekly - 1-3 sets - 10 reps      Therapeutic Exercise and NMR EXR  [x] (79172) Provided verbal/tactile cueing for activities related to strengthening, flexibility, endurance, ROM for improvements in LE, proximal hip, and core control with self care, mobility, lifting, ambulation.  [] (51669) Provided verbal/tactile cueing for activities related to improving balance, coordination, kinesthetic sense, posture, motor skill, proprioception  to assist with LE, proximal hip, and core control in self care, mobility, lifting, ambulation and eccentric single leg control.   [] (55046) Therapist is in constant attendance of 2 or more patients providing skilled therapy interventions, but not providing any significant amount of measurable one-on-one time to either patient, for improvements in LE, proximal hip, and core control in self care, mobility, lifting, ambulation and eccentric single leg control. NMR and Therapeutic Activities:    [x] (19482 or 87627) Provided verbal/tactile cueing for activities related to improving balance, coordination, kinesthetic sense, posture, motor skill, proprioception and motor activation to allow for proper function of core, proximal hip and LE with self care and ADLs  [] (87647) Gait Re-education- Provided training and instruction to the patient for proper LE, core and proximal hip recruitment and positioning and eccentric body weight control with ambulation re-education including up and down stairs     Home Exercise Program:    [x] (81145) Reviewed/Progressed HEP activities related to strengthening, flexibility, endurance, ROM of core, proximal hip and LE for functional self-care, mobility, lifting and ambulation/stair navigation   [] (63319)Reviewed/Progressed HEP activities related to improving balance, coordination, kinesthetic sense, posture, motor skill, proprioception of core, proximal hip and LE for self care, mobility, lifting, and ambulation/stair navigation      Manual Treatments:  PROM / STM / Oscillations-Mobs:  G-I, II, III, IV (PA's, Inf., Post.)  [] (48860) Provided manual therapy to mobilize LE, proximal hip and/or LS spine soft tissue/joints for the purpose of modulating pain, promoting relaxation,  increasing ROM, reducing/eliminating soft tissue swelling/inflammation/restriction, improving soft tissue extensibility and allowing for proper ROM for normal function with self care, mobility, lifting and ambulation.      Modalities:  [] (11195) Vasopneumatic compression: Utilized vasopneumatic compression to decrease edema / swelling for the purpose of improving mobility and quad tone / recruitment which will allow for increased overall function including but not limited to self-care, transfers, ambulation, and ascending / descending stairs. Charges:  Timed Code Treatment Minutes: 10   Total Treatment Minutes: 45     [x] EVAL - LOW (70057)   [] EVAL - MOD (29312)  [] EVAL - HIGH (11265)  [] RE-EVAL (19606)  [x] GY(02074) x   1    [] Ionto  [] NMR (62900) x       [] Vaso  [] Manual (77998) x       [] Ultrasound  [x] TA x  1      [] Mech Traction (89423)  [] Aquatic Therapy x     [] ES (un) (47954):   [] Home Management Training x  [] ES(attended) (75720)   [] Dry Needling 1-2 muscles (82512):  [] Dry Needling 3+ muscles (496112  [] Group:      [] Other:     GOALS:  Patient stated goal: \"gym, drive\"  [] Progressing: [] Met: [] Not Met: [] Adjusted    Therapist goals for Patient:   Short Term Goals: To be achieved in: 2 weeks  1. Independent in HEP and progression per patient tolerance, in order to prevent re-injury. [] Progressing: [] Met: [] Not Met: [] Adjusted  2. Patient will have a decrease in pain to facilitate improvement in movement, function, and ADLs as indicated by Functional Deficits. [] Progressing: [] Met: [] Not Met: [] Adjusted    Long Term Goals: To be achieved in: 10 weeks  1. Pt will improve LEFS by 9 points to reduce disability and progress towards PLOF. [] Progressing: [] Met: [] Not Met: [] Adjusted  2. Patient will demonstrate increased R ankle AROM to improve DF to 0, other ROM by 5 degrees to allow for proper joint functioning as indicated by patients Functional Deficits. [] Progressing: [] Met: [] Not Met: [] Adjusted  3. Patient will demonstrate an increase in R ankle Strength to at least 5/5 as well as good proximal hip strength and control to allow for proper functional mobility as indicated by patients Functional Deficits. [] Progressing: [] Met: [] Not Met: [] Adjusted  4.  Patient will return to functional activities including ascending/descending at least 10 stairs reciprocally with at least one handrail without increased symptoms or restriction. [] Progressing: [] Met: [] Not Met: [] Adjusted  5. Patient will be able to demonstrate improved balance by performing SLS on RLE for at least 20 seconds independently barefoot on floor. [] Progressing: [] Met: [] Not Met: [] Adjusted    Overall Progression Towards Functional goals/ Treatment Progress Update:  [] Patient is progressing as expected towards functional goals listed. [] Progression is slowed due to complexities/Impairments listed. [] Progression has been slowed due to co-morbidities. [x] Plan just implemented, too soon to assess goals progression <30days   [] Goals require adjustment due to lack of progress  [] Patient is not progressing as expected and requires additional follow up with physician  [] Other    Persisting Functional Limitations/Impairments:  []Sitting [x]Standing   [x]Walking [x]Stairs   [x]Transfers [x]ADLs   [x]Squatting/bending [x]Kneeling  [x]Housework [x]Job related tasks  [x]Driving [x]Sports/Recreation   []Sleeping []Other:    ASSESSMENT:  See eval  Treatment/Activity Tolerance:  [] Pt able to complete treatment [] Patient limited by fatique  [x] Patient limited by pain  [] Patient limited by other medical complications  [] Other:     Prognosis:  [x] Good [] Fair  [] Poor    Patient Requires Follow-up: [x] Yes  [] No    Return to Play:    [x]  N/A   []  Stage 1: Intro to Strength   []  Stage 2: Return to Run and Strength   []  Stage 3: Return to Jump and Strength   []  Stage 4: Dynamic Strength and Agility   []  Stage 5: Sport Specific Training     []  Ready to Return to Play, Meets All Above Stages   []  Not Ready for Return to Sports   Comments:            PLAN: See eval. PT 1-2x / week for 10 weeks.    [] Continue per plan of care [] Alter current plan (see comments)  [x] Plan of care initiated [] Hold pending MD visit [] Discharge    Electronically signed by: Mitzy Forte, PT, DPT      Note: If patient does not return for scheduled/ recommended follow up visits, this note will serve as a discharge from care along with most recent update on progress.

## 2023-03-10 ENCOUNTER — HOSPITAL ENCOUNTER (OUTPATIENT)
Dept: PHYSICAL THERAPY | Age: 33
Setting detail: THERAPIES SERIES
Discharge: HOME | End: 2023-03-10
Payer: COMMERCIAL

## 2023-03-10 PROCEDURE — 97112 NEUROMUSCULAR REEDUCATION: CPT

## 2023-03-10 PROCEDURE — 97116 GAIT TRAINING THERAPY: CPT

## 2023-03-10 PROCEDURE — 97016 VASOPNEUMATIC DEVICE THERAPY: CPT

## 2023-03-10 PROCEDURE — 97110 THERAPEUTIC EXERCISES: CPT

## 2023-03-10 NOTE — FLOWSHEET NOTE
[address] [place] [address]  Phone: [phone]   Fax: [phone]    Physical Therapy Daily Treatment Note    Date:  03/10/2023     Patient Name:  Liz Maguire    :  1990  MRN: [id number]  Medical Diagnosis:  B20.131Y (ICD-10-CM) - Displaced trimalleolar fracture of right lower leg, subsequent encounter for open fracture type I or II with routine healing  Treatment Diagnosis: decreased R ankle ROM, strength, antalgic gait     Insurance/Certification information:  PT Insurance Information: [facility]  Physician Information:  Eddie Root MD    Plan of care signed (Y/N): [x]  Yes []  No     Date of Patient follow up with Physician: 23     Progress Report: []  Yes  [x]  No     Date Range for reporting period:  Beginning: 3/7/2023  Ending:     Progress report due (10 Rx/or 30 days whichever is less): visit #10 or 3/6/07 (date)     Recertification due (POC duration/ or 90 days whichever is less): visit #20 or 23 (date)     Visit # Insurance Allowable Auth required? Date Range   2/10 30/yr - soft max [x]  Yes  []  No 3/7/23-6/10/23     Latex Allergy:  [x]NO      []YES  Preferred Language for Healthcare:   [x]English       []other:    Functional Scale:       Date assessed:  LEFS: raw score = 33; dysfunction = 59%  3/7/23    Pain level:  0/10     SUBJECTIVE:  Pt reports increased stiffness in ankle, she was able to complete HEP. Pt has not been using crutch for ambulation when wearing boot. She uses two crutches when wearing shoes at home. OBJECTIVE:   3/10: antalgic, stiff gait pattern beginning of session. Towards end of session, pt with better sequencing of gait for heel-toe rocker w/ tennis shoe and crutch on L.  Pt leans to L d/t pain  See eval      RESTRICTIONS/PRECAUTIONS: s/p R ankle ORIF for trimalleolar fx  Per pt, MD wants her out of boot as much as possible  Progressed from 2 to 1 crutch last week along with walking in tennis shoe at therapy    Exercises/Interventions: wants to be able to drive, use elliptical    Therapeutic Exercise (76946)  Resistance / level Sets/sec Reps Notes / Cues   bike  3 min  3/10   IB: gastroc/soleus  30\" 2 ea R 3/10   Ankle EV/INV w/ TB    HEP   Seated HR/TR    HEP   AROM INV/EV    HEP   Squats to chair       Leg Press       Quantum  -leg ext  -leg curl       Hip 3 way standing                     Gait (55361)       [facility] walking       Progress from crutch to IND       Gait training throughout clinic w/ shoes and one crutch X 250 ft   3/10: cues to improve heel-toe rocker foot, knee flex          Therapeutic Activities (07729)                                   Neuromuscular Re-ed (98695)       Alter G: size XL  X 10 min 0% incline  0.6 mph  50% BW 3/10   tandem  15\" 2 R post 3/10   NBOS       SLS    3/10: not yet reyes   Weight shift: m/l, a/p  1 20 3/10: BUE support          Manual Intervention (57272)       Knee mobs/PROM       Tib/Fem Mobs       Patella Mobs       Ankle mobs                         Modalities:   vasopneumatic compression for edema to R ankle in long sitting  Date: Pre-vaso (cm) Post-vaso (cm) Pressure Time   3/10 35.5 35.0 low 10 min        Pt. Education:  03/07/2023  -pt educated on diagnosis, prognosis and expectations for rehab  -all pt questions were answered  -educated on LE machines to use at gym: leg ext, curl; hip abd/add, bike    Home Exercise Program:  Access Code: [email or web address]  URL: [email or web address]  Date: 03/07/2023  Prepared by: Arlen Sequeira    Exercises  Long Sitting Soleus Stretch on Bolster with Strap - 1-4 x daily - 7 x weekly - 1 sets - 3-5 reps - 20-30 seconds hold  Long Sitting Calf Stretch with Strap - 1-4 x daily - 7 x weekly - 1 sets - 3-5 reps - 20-30 seconds hold  Long Sitting Ankle Eversion with Resistance - 1 x daily - 7 x weekly - 3 sets - 10 reps  Long Sitting Ankle Inversion with Resistance - 1 x daily - 7 x weekly - 3 sets - 10 reps  Seated Heel Raise - 1 x  daily - 7 x weekly - 2-3 sets - 10 reps  Seated Toe Raise - 1 x daily - 7 x weekly - 2-3 sets - 10 reps  Ankle Inversion Eversion Towel Slide - 1 x daily - 7 x weekly - 1-3 sets - 10 reps    3/10: lime band given      Therapeutic Exercise and NMR EXR  [x] (22913) Provided verbal/tactile cueing for activities related to strengthening, flexibility, endurance, ROM for improvements in LE, proximal hip, and core control with self care, mobility, lifting, ambulation.  [] (91125) Provided verbal/tactile cueing for activities related to improving balance, coordination, kinesthetic sense, posture, motor skill, proprioception  to assist with LE, proximal hip, and core control in self care, mobility, lifting, ambulation and eccentric single leg control.   [] (52104) Therapist is in constant attendance of 2 or more patients providing skilled therapy interventions, but not providing any significant amount of measurable one-on-one time to either patient, for improvements in LE, proximal hip, and core control in self care, mobility, lifting, ambulation and eccentric single leg control.      NMR and Therapeutic Activities:    [x] (05825 or 87535) Provided verbal/tactile cueing for activities related to improving balance, coordination, kinesthetic sense, posture, motor skill, proprioception and motor activation to allow for proper function of core, proximal hip and LE with self care and ADLs  [] (08017) Gait Re-education- Provided training and instruction to the patient for proper LE, core and proximal hip recruitment and positioning and eccentric body weight control with ambulation re-education including up and down stairs     Home Exercise Program:    [x] (88612) Reviewed/Progressed HEP activities related to strengthening, flexibility, endurance, ROM of core, proximal hip and LE for functional self-care, mobility, lifting and ambulation/stair navigation   [] (99407)Reviewed/Progressed HEP activities related to improving balance, coordination, kinesthetic sense, posture, motor skill, proprioception of core, proximal hip and LE for self care, mobility, lifting, and ambulation/stair navigation      Manual Treatments:  PROM / STM / Oscillations-Mobs:  G-I, II, III, IV (PA's, Inf., Post.)  [] (68569) Provided manual therapy to mobilize LE, proximal hip and/or LS spine soft tissue/joints for the purpose of modulating pain, promoting relaxation,  increasing ROM, reducing/eliminating soft tissue swelling/inflammation/restriction, improving soft tissue extensibility and allowing for proper ROM for normal function with self care, mobility, lifting and ambulation. Modalities:  [] (76659) Vasopneumatic compression: Utilized vasopneumatic compression to decrease edema / swelling for the purpose of improving mobility and quad tone / recruitment which will allow for increased overall function including but not limited to self-care, transfers, ambulation, and ascending / descending stairs. Charges:  Timed Code Treatment Minutes: 45   Total Treatment Minutes: 55     Units approved Units used Date Range   100 units: TE, TA, NR, MAN, VASO  1TE  1 TA  1 NR  MAN  1 VASO 3/7/23-6/10/23     [] EVAL - LOW (48015)   [] EVAL - MOD (20938)  [] EVAL - HIGH (93608)  [] RE-EVAL (34102)  [x] LL(31568) x   1    [] Ionto  [x] NMR (49748) x 1      [x] Vaso  [] Manual (70477) x       [] Ultrasound  [] TA x  1      [] Mech Traction (20939)  [] Aquatic Therapy x     [] ES (un) (55494):   [] Home Management Training x  [] ES(attended) (72993)   [] Dry Needling 1-2 muscles (45374):  [] Dry Needling 3+ muscles (463072  [] Group:       [x]Other: GAIT x 1    GOALS:  Patient stated goal: \"gym, drive\"  [] Progressing: [] Met: [] Not Met: [] Adjusted    Therapist goals for Patient:   Short Term Goals: To be achieved in: 2 weeks  1. Independent in HEP and progression per patient tolerance, in order to prevent re-injury. [] Progressing: [] Met: [] Not Met: [] Adjusted  2. Patient will have a decrease in pain to facilitate improvement in movement, function, and ADLs as indicated by Functional Deficits. [] Progressing: [] Met: [] Not Met: [] Adjusted    Long Term Goals: To be achieved in: 10 weeks  1. Pt will improve LEFS by 9 points to reduce disability and progress towards PLOF. [] Progressing: [] Met: [] Not Met: [] Adjusted  2. Patient will demonstrate increased R ankle AROM to improve DF to 0, other ROM by 5 degrees to allow for proper joint functioning as indicated by patients Functional Deficits. [] Progressing: [] Met: [] Not Met: [] Adjusted  3. Patient will demonstrate an increase in R ankle Strength to at least 5/5 as well as good proximal hip strength and control to allow for proper functional mobility as indicated by patients Functional Deficits. [] Progressing: [] Met: [] Not Met: [] Adjusted  4. Patient will return to functional activities including ascending/descending at least 10 stairs reciprocally with at least one handrail without increased symptoms or restriction. [] Progressing: [] Met: [] Not Met: [] Adjusted  5. Patient will be able to demonstrate improved balance by performing SLS on RLE for at least 20 seconds independently barefoot on floor. [] Progressing: [] Met: [] Not Met: [] Adjusted    Overall Progression Towards Functional goals/ Treatment Progress Update:  [] Patient is progressing as expected towards functional goals listed. [] Progression is slowed due to complexities/Impairments listed. [] Progression has been slowed due to co-morbidities.   [x] Plan just implemented, too soon to assess goals progression <30days   [] Goals require adjustment due to lack of progress  [] Patient is not progressing as expected and requires additional follow up with physician  [] Other    Persisting Functional Limitations/Impairments:  []Sitting [x]Standing   [x]Walking [x]Stairs   [x]Transfers [x]ADLs   [x]Squatting/bending [x]Kneeling  [x]Housework [x]Job related tasks  [x]Driving [x]Sports/Recreation   []Sleeping []Other:    ASSESSMENT:  Pt with fair tolerance to session. Pt completed session in tennis shoes. Pt able to tolerate 10 minutes of walking on Alter G with good mechanics at 50% BW. Pt with increased pain with all WB throughout session, but motivated to push through. Pt required verbal cuing for improved technique and appropriate mm activation. Will plan to slowly progress WB in tennis shoes with good gait mechanics per pt tolerance to return to PLOF. Treatment/Activity Tolerance:  [] Pt able to complete treatment [] Patient limited by fatique  [x] Patient limited by pain  [] Patient limited by other medical complications  [] Other:     Prognosis:  [x] Good [] Fair  [] Poor    Patient Requires Follow-up: [x] Yes  [] No    Return to Play:    [x]  N/A   []  Stage 1: Intro to Strength   []  Stage 2: Return to Run and Strength   []  Stage 3: Return to Jump and Strength   []  Stage 4: Dynamic Strength and Agility   []  Stage 5: Sport Specific Training     []  Ready to Return to Play, Meets All Above Stages   []  Not Ready for Return to Sports   Comments:            PLAN: See eval. PT 1-2x / week for 10 weeks. [x] Continue per plan of care [] Alter current plan (see comments)  [] Plan of care initiated [] Hold pending MD visit [] Discharge    Electronically signed by: Ale Sharpe, PT, DPT      Note: If patient does not return for scheduled/ recommended follow up visits, this note will serve as a discharge from care along with most recent update on progress.

## 2023-03-14 ENCOUNTER — HOSPITAL ENCOUNTER (OUTPATIENT)
Dept: PHYSICAL THERAPY | Age: 33
Setting detail: THERAPIES SERIES
Discharge: HOME | End: 2023-03-14
Payer: COMMERCIAL

## 2023-03-14 PROCEDURE — 97112 NEUROMUSCULAR REEDUCATION: CPT

## 2023-03-14 PROCEDURE — 97016 VASOPNEUMATIC DEVICE THERAPY: CPT

## 2023-03-14 PROCEDURE — 97530 THERAPEUTIC ACTIVITIES: CPT

## 2023-03-14 PROCEDURE — 97110 THERAPEUTIC EXERCISES: CPT

## 2023-03-14 NOTE — FLOWSHEET NOTE
[address] [place] [address] [facility]  Phone: [phone]   Fax: [phone]    Physical Therapy Daily Treatment Note    Date:  2023     Patient Name:  Boris Camarena    :  1990  MRN: [id number]  Medical Diagnosis:  A66.767X (ICD-10-CM) - Displaced trimalleolar fracture of right lower leg, subsequent encounter for open fracture type I or II with routine healing  Treatment Diagnosis: decreased R ankle ROM, strength, antalgic gait     Insurance/Certification information:  PT Insurance Information: [facility]  Physician Information:  Neena Cotter MD    Plan of care signed (Y/N): [x]  Yes []  No     Date of Patient follow up with Physician: 23     Progress Report: []  Yes  [x]  No     Date Range for reporting period:  Beginning: 3/7/2023  Ending:     Progress report due (10 Rx/or 30 days whichever is less): visit #10 or 0/1/15 (date)     Recertification due (POC duration/ or 90 days whichever is less): visit #20 or 23 (date)     Visit # Insurance Allowable Auth required? Date Range   3/10 30/yr - soft max [x]  Yes  []  No 3/7/23-6/10/23     Latex Allergy:  [x]NO      []YES  Preferred Language for Healthcare:   [x]English       []other:    Functional Scale:       Date assessed:  LEFS: raw score = 33; dysfunction = 59%  3/7/23    Pain level:  0/10 in NWB, 2-7/10 WB, walking     SUBJECTIVE:  Pt reports increased swelling in lateral ankle at work. Pt reports minimal soreness after last visit. Didn't wake up at night d/t pain. OBJECTIVE:   3/14: malleolar circumference before vaso: 27cm, 26.5cm after vaso  3/10: antalgic, stiff gait pattern beginning of session. Towards end of session, pt with better sequencing of gait for heel-toe rocker w/ tennis shoe and crutch on L.  Pt leans to L d/t pain  See eval      RESTRICTIONS/PRECAUTIONS: s/p R ankle ORIF for trimalleolar fx  Per pt, MD wants her out of boot as much as possible  Progressed from 2 to 1 crutch last week along with walking in tennis shoe at therapy    Exercises/Interventions: wants to be able to drive, use elliptical    Therapeutic Exercise (89931)  Resistance / level Sets/sec Reps Notes / Cues   bike  3 min  3/10   IB: gastroc/soleus  30\" 2 ea R 3/10   Ankle EV/INV w/ TB    HEP   Seated HR/TR    HEP   AROM INV/EV    HEP   Squats to chair       Leg Press (seat 5)  -eccentric  -SL   70#  50#   1  1   10  10 R 3/14:    Quantum  -leg ext  -leg curl    3/14: added to HEP   Hip 3 way standing (stance on L)  1 10  3/14 added to HEP: standing in LLE on land, complete B in water   BAPS Board: cw/ccw L2 1 30 ea R 3/14          Gait (78158) - billed as TA       [facility] walking       Progress from crutch to IND       Gait training throughout clinic w/ shoes and one crutch X 250 ft   3/10: cues to improve heel-toe rocker foot, knee flex. 3/14: Added knee drive for inc knee flex            Therapeutic Activities (89486)       Pt education X 15 min   3/14: updated HEP, what to do at home, gym, and in pool, return to driving, MD f/u, AD usage, compression                        Neuromuscular Re-ed (94092)       Alter G: size XL  Height of cock pit at level 10  X 10 min 0% incline  0.6 mph  60-70% BW 3/10, 3/14: progressed BW%   Alter G: heel raises  Alter G: mini squats 50% BW  50% BW 3  3 10  10 3/14   tandem  3/10   NBOS       SLS    3/10: not yet reyes   Weight shift: m/l, a/p  3/10: BUE support          Manual Intervention (06243)       Knee mobs/PROM       Tib/Fem Mobs       Patella Mobs       Ankle mobs                         Modalities:   vasopneumatic compression for edema to R ankle in long sitting  Date: Pre-vaso (cm) Post-vaso (cm) Pressure Time   3/10 35.5 35.0 low 10 min   3/14 35.7 35.0 mod 15 min        Pt.  Education:  03/07/2023  -pt educated on diagnosis, prognosis and expectations for rehab  -all pt questions were answered  -educated on LE machines to use at gym: leg ext, curl; hip abd/add, bike    Home Exercise Program:  Access Code: [email or web address]  URL: [email or web address]  Date: 03/07/2023  Prepared by: Allean Harada    Exercises  Long Sitting Soleus Stretch on Bolster with Strap - 1-4 x daily - 7 x weekly - 1 sets - 3-5 reps - 20-30 seconds hold  Long Sitting Calf Stretch with Strap - 1-4 x daily - 7 x weekly - 1 sets - 3-5 reps - 20-30 seconds hold  Long Sitting Ankle Eversion with Resistance - 1 x daily - 7 x weekly - 3 sets - 10 reps  Long Sitting Ankle Inversion with Resistance - 1 x daily - 7 x weekly - 3 sets - 10 reps  Seated Heel Raise - 1 x daily - 7 x weekly - 2-3 sets - 10 reps  Seated Toe Raise - 1 x daily - 7 x weekly - 2-3 sets - 10 reps  Ankle Inversion Eversion Towel Slide - 1 x daily - 7 x weekly - 1-3 sets - 10 reps    3/10: lime band given    3/14:  Eccentric Leg Press - 1 x daily - 7 x weekly - 1-3 sets - 10 reps  Full Leg Press - 1 x daily - 7 x weekly - 1-3 sets - 10 reps  Single Leg Press - 1 x daily - 7 x weekly - 1-3 sets - 10 reps  Single Leg Knee Extension with Weight Machine - 1 x daily - 7 x weekly - 3 sets - 10 reps  Single Leg Hamstring Curl with Weight Machine - 1 x daily - 7 x weekly - 3 sets - 10 reps  Forward and Backward Step Over in [place] - 1 x daily - 7 x weekly - 3 sets - 10 reps  Walking in Standard [place] with [employer] - 1 x daily - 7 x weekly - 3 sets - 10 reps  Standing Heel Raise - 1 x daily - 7 x weekly - 3 sets - 10 reps  Standing March with Counter Support - 1 x daily - 7 x weekly - 3 sets - 10 reps  Standing Hip Extension with Counter Support - 1 x daily - 7 x weekly - 3 sets - 10 reps  Standing Hip Abduction with Counter Support - 1 x daily - 7 x weekly - 3 sets - 10 reps    Therapeutic Exercise and NMR EXR  [x] (92473) Provided verbal/tactile cueing for activities related to strengthening, flexibility, endurance, ROM for improvements in LE, proximal hip, and core control with self care, mobility, lifting, ambulation.  [] (86747) Provided verbal/tactile cueing for activities related to improving balance, coordination, kinesthetic sense, posture, motor skill, proprioception  to assist with LE, proximal hip, and core control in self care, mobility, lifting, ambulation and eccentric single leg control.   [] (24668) Therapist is in constant attendance of 2 or more patients providing skilled therapy interventions, but not providing any significant amount of measurable one-on-one time to either patient, for improvements in LE, proximal hip, and core control in self care, mobility, lifting, ambulation and eccentric single leg control.      NMR and Therapeutic Activities:    [x] (24414 or 31921) Provided verbal/tactile cueing for activities related to improving balance, coordination, kinesthetic sense, posture, motor skill, proprioception and motor activation to allow for proper function of core, proximal hip and LE with self care and ADLs  [] (79998) Gait Re-education- Provided training and instruction to the patient for proper LE, core and proximal hip recruitment and positioning and eccentric body weight control with ambulation re-education including up and down stairs     Home Exercise Program:    [x] (28413) Reviewed/Progressed HEP activities related to strengthening, flexibility, endurance, ROM of core, proximal hip and LE for functional self-care, mobility, lifting and ambulation/stair navigation   [] (77276)Reviewed/Progressed HEP activities related to improving balance, coordination, kinesthetic sense, posture, motor skill, proprioception of core, proximal hip and LE for self care, mobility, lifting, and ambulation/stair navigation      Manual Treatments:  PROM / STM / Oscillations-Mobs:  G-I, II, III, IV (PA's, Inf., Post.)  [] (85321) Provided manual therapy to mobilize LE, proximal hip and/or LS spine soft tissue/joints for the purpose of modulating pain, promoting relaxation,  increasing ROM, reducing/eliminating soft tissue swelling/inflammation/restriction, improving soft tissue extensibility and allowing for proper ROM for normal function with self care, mobility, lifting and ambulation. Modalities:  [] (26444) Vasopneumatic compression: Utilized vasopneumatic compression to decrease edema / swelling for the purpose of improving mobility and quad tone / recruitment which will allow for increased overall function including but not limited to self-care, transfers, ambulation, and ascending / descending stairs. Charges:  Timed Code Treatment Minutes: 54   Total Treatment Minutes: 69     Units approved Units used Date Range   100 units: TE, TA, NR, MAN, VASO  2 TE  3 TA  2 NR  MAN  2 VASO 3/7/23-6/10/23     [] EVAL - LOW (52123)   [] EVAL - MOD (18961)  [] EVAL - HIGH (17987)  [] RE-EVAL (34797)  [x] NN(80977) x   1    [] Ionto  [x] NMR (32625) x 1      [x] Vaso  [] Manual (42022) x       [] Ultrasound  [x] TA x  \2      [] Mech Traction (26627)  [] Aquatic Therapy x     [] ES (un) (64095):   [] Home Management Training x  [] ES(attended) (48958)   [] Dry Needling 1-2 muscles (74495):  [] Dry Needling 3+ muscles (211554  [] Group:       []Other:     GOALS:  Patient stated goal: \"gym, drive\"  [] Progressing: [] Met: [] Not Met: [] Adjusted    Therapist goals for Patient:   Short Term Goals: To be achieved in: 2 weeks  1. Independent in HEP and progression per patient tolerance, in order to prevent re-injury. [] Progressing: [] Met: [] Not Met: [] Adjusted  2. Patient will have a decrease in pain to facilitate improvement in movement, function, and ADLs as indicated by Functional Deficits. [] Progressing: [] Met: [] Not Met: [] Adjusted    Long Term Goals: To be achieved in: 10 weeks  1. Pt will improve LEFS by 9 points to reduce disability and progress towards PLOF. [] Progressing: [] Met: [] Not Met: [] Adjusted  2.  Patient will demonstrate increased R ankle AROM to improve DF to 0, other ROM by 5 degrees to allow for proper joint functioning as indicated by patients Functional Deficits. [] Progressing: [] Met: [] Not Met: [] Adjusted  3. Patient will demonstrate an increase in R ankle Strength to at least 5/5 as well as good proximal hip strength and control to allow for proper functional mobility as indicated by patients Functional Deficits. [] Progressing: [] Met: [] Not Met: [] Adjusted  4. Patient will return to functional activities including ascending/descending at least 10 stairs reciprocally with at least one handrail without increased symptoms or restriction. [] Progressing: [] Met: [] Not Met: [] Adjusted  5. Patient will be able to demonstrate improved balance by performing SLS on RLE for at least 20 seconds independently barefoot on floor. [] Progressing: [] Met: [] Not Met: [] Adjusted    Overall Progression Towards Functional goals/ Treatment Progress Update:  [] Patient is progressing as expected towards functional goals listed. [] Progression is slowed due to complexities/Impairments listed. [] Progression has been slowed due to co-morbidities. [x] Plan just implemented, too soon to assess goals progression <30days   [] Goals require adjustment due to lack of progress  [] Patient is not progressing as expected and requires additional follow up with physician  [] Other    Persisting Functional Limitations/Impairments:  []Sitting [x]Standing   [x]Walking [x]Stairs   [x]Transfers [x]ADLs   [x]Squatting/bending [x]Kneeling  [x]Housework [x]Job related tasks  [x]Driving [x]Sports/Recreation   []Sleeping []Other:    ASSESSMENT:  Pt with fair tolerance to session. Pt able to progress in BW% with walking in Alter G, able to add in WB endurance/tolerance with added exercises while in Alter G. Pt provided with updated HEP to use at home and in the pool [facility] to cont strengthening outside of sessions. Pt able to demo heel-toe gait pattern with one crutch without cuing this date.  Cont to be limited in gait mechanics and CKC activities d/t pain and lack of mobility in TC joint. Cont to progress mobility and function per pt tolerance to full WB activities. Treatment/Activity Tolerance:  [] Pt able to complete treatment [] Patient limited by fatique  [x] Patient limited by pain  [] Patient limited by other medical complications  [] Other:     Prognosis:  [x] Good [] Fair  [] Poor    Patient Requires Follow-up: [x] Yes  [] No    Return to Play:    [x]  N/A   []  Stage 1: Intro to Strength   []  Stage 2: Return to Run and Strength   []  Stage 3: Return to Jump and Strength   []  Stage 4: Dynamic Strength and Agility   []  Stage 5: Sport Specific Training     []  Ready to Return to Play, Meets All Above Stages   []  Not Ready for Return to Sports   Comments:            PLAN: See eval. PT 1-2x / week for 10 weeks. [x] Continue per plan of care [] Alter current plan (see comments)  [] Plan of care initiated [] Hold pending MD visit [] Discharge    Electronically signed by: Allean Harada, PT, DPT      Note: If patient does not return for scheduled/ recommended follow up visits, this note will serve as a discharge from care along with most recent update on progress.

## 2023-03-16 ENCOUNTER — HOSPITAL ENCOUNTER (OUTPATIENT)
Dept: PHYSICAL THERAPY | Age: 33
Setting detail: THERAPIES SERIES
Discharge: HOME | End: 2023-03-16
Payer: COMMERCIAL

## 2023-03-16 PROCEDURE — 97110 THERAPEUTIC EXERCISES: CPT

## 2023-03-16 PROCEDURE — 97016 VASOPNEUMATIC DEVICE THERAPY: CPT

## 2023-03-16 PROCEDURE — 97112 NEUROMUSCULAR REEDUCATION: CPT

## 2023-03-16 NOTE — FLOWSHEET NOTE
[address] [place] [address]  Phone: [phone]   Fax: [phone]    Physical Therapy Daily Treatment Note    Date:  2023     Patient Name:  Billy Espinoza    :  1990  MRN: [id number]  Medical Diagnosis:  V83.086Y (ICD-10-CM) - Displaced trimalleolar fracture of right lower leg, subsequent encounter for open fracture type I or II with routine healing  Treatment Diagnosis: decreased R ankle ROM, strength, antalgic gait     Insurance/Certification information:  PT Insurance Information: [facility]  Physician Information:  Sarah Marie MD    Plan of care signed (Y/N): [x]  Yes []  No     Date of Patient follow up with Physician: 23     Progress Report: []  Yes  [x]  No     Date Range for reporting period:  Beginning: 3/7/2023  Ending:     Progress report due (10 Rx/or 30 days whichever is less): visit #10 or       Recertification due (POC duration/ or 90 days whichever is less): visit #20 or 23     Visit # Insurance Allowable Auth required? Date Range   4/10 30/yr - soft max [x]  Yes  []  No 3/7/23-6/10/23     Latex Allergy:  [x]NO      []YES  Preferred Language for Healthcare:   [x]English       []other:    Functional Scale:       Date assessed:  LEFS: raw score = 33; dysfunction = 59%  3/7/23    Pain level:  0/10 in NWB, 2/10 WB, walking     SUBJECTIVE:  Pt reports increased swelling in R ankle. She did a lot of walking yesterday and states she was barely able to put a shoe on and noted some pitting edema. Swelling more under control this am.       OBJECTIVE:   3/14: malleolar circumference before vaso: 27cm, 26.5cm after vaso  3/10: antalgic, stiff gait pattern beginning of session. Towards end of session, pt with better sequencing of gait for heel-toe rocker w/ tennis shoe and crutch on L.  Pt leans to L d/t pain  See eval      RESTRICTIONS/PRECAUTIONS: s/p R ankle ORIF for trimalleolar fx  Per pt, MD wants her out of boot as much as possible  Progressed from 2 to 1 crutch last week along with walking in tennis shoe at therapy    Exercises/Interventions: wants to be able to drive, use elliptical    Therapeutic Exercise (87375)  Resistance / level Sets/sec Reps Notes / Cues   bike  3 min  3/10   IB: gastroc/soleus  30\" 2 ea R 3/10   Ankle EV/INV w/ TB    HEP   Seated HR/TR    HEP   AROM INV/EV    HEP   Squats to chair       Leg Press (seat 5)  -eccentric  -SL   55#   2   10 R 3/14:    Quantum  -leg ext  -leg curl    3/14: added to HEP   Hip 3 way standing (stance on L) 3/14 added to HEP: standing in LLE on land, complete B in water   BAPS Board: cw/ccw 3/14   FSU 4\" 2 10 R 3/16   squats TRX 2 10 3/16                        Gait (44210) - billed as TA       [facility] walking       Progress from crutch to IND       Gait training throughout clinic w/ shoes and one crutch   3/10: cues to improve heel-toe rocker foot, knee flex. 3/14: Added knee drive for inc knee flex            Therapeutic Activities (75070)       Pt education   3/14: updated HEP, what to do at home, gym, and in pool, return to driving, MD f/u, AD usage, compression                        Neuromuscular Re-ed (22431)       Alter G: size XL  Height of cock pit at level 10  X 10 min 0% incline  0.7-0.8 mph  60-70% BW 3/10, 3/14: progressed BW%.  3/16: cued for heel-toe rocker w/ dec eversion   Alter G: heel raises  Alter G: mini squats 60% BW  60% BW 3  3 10  10 3/14   tandem  3/10   NBOS       SLS    3/10: not yet reyes   Weight shift: m/l, a/p  3/10: BUE support   LSD 4\" 1 3 R 3/16: not yet reyes                        Manual Intervention (08852)       Knee mobs/PROM       Tib/Fem Mobs       Patella Mobs       Ankle mobs                         Modalities:   vasopneumatic compression for edema to R ankle in long sitting  Date: Pre-vaso (cm) Post-vaso (cm) Pressure Time   3/10 35.5 35.0 low 10 min   3/14 35.7 35.0 mod 15 min   3/16 35.2 34.6 mod 15 min               Pt. Education:  03/07/2023  -pt educated on diagnosis, prognosis and expectations for rehab  -all pt questions were answered  -educated on LE machines to use at gym: leg ext, curl; hip abd/add, bike    Home Exercise Program:  Access Code: [email or web address]  URL: [email or web address]  Date: 03/07/2023  Prepared by: Laureano Kang    Exercises  Long Sitting Soleus Stretch on Bolster with Strap - 1-4 x daily - 7 x weekly - 1 sets - 3-5 reps - 20-30 seconds hold  Long Sitting Calf Stretch with Strap - 1-4 x daily - 7 x weekly - 1 sets - 3-5 reps - 20-30 seconds hold  Long Sitting Ankle Eversion with Resistance - 1 x daily - 7 x weekly - 3 sets - 10 reps  Long Sitting Ankle Inversion with Resistance - 1 x daily - 7 x weekly - 3 sets - 10 reps  Seated Heel Raise - 1 x daily - 7 x weekly - 2-3 sets - 10 reps  Seated Toe Raise - 1 x daily - 7 x weekly - 2-3 sets - 10 reps  Ankle Inversion Eversion Towel Slide - 1 x daily - 7 x weekly - 1-3 sets - 10 reps    3/10: lime band given    3/14:  Eccentric Leg Press - 1 x daily - 7 x weekly - 1-3 sets - 10 reps  Full Leg Press - 1 x daily - 7 x weekly - 1-3 sets - 10 reps  Single Leg Press - 1 x daily - 7 x weekly - 1-3 sets - 10 reps  Single Leg Knee Extension with Weight Machine - 1 x daily - 7 x weekly - 3 sets - 10 reps  Single Leg Hamstring Curl with Weight Machine - 1 x daily - 7 x weekly - 3 sets - 10 reps  Forward and Backward Step Over in [place] - 1 x daily - 7 x weekly - 3 sets - 10 reps  Walking in Standard [place] with [employer] - 1 x daily - 7 x weekly - 3 sets - 10 reps  Standing Heel Raise - 1 x daily - 7 x weekly - 3 sets - 10 reps  Standing March with Counter Support - 1 x daily - 7 x weekly - 3 sets - 10 reps  Standing Hip Extension with Counter Support - 1 x daily - 7 x weekly - 3 sets - 10 reps  Standing Hip Abduction with Counter Support - 1 x daily - 7 x weekly - 3 sets - 10 reps    Therapeutic Exercise and NMR EXR  [x] (55474) Provided verbal/tactile cueing for activities related to strengthening, flexibility, endurance, ROM for improvements in LE, proximal hip, and core control with self care, mobility, lifting, ambulation.  [] (44676) Provided verbal/tactile cueing for activities related to improving balance, coordination, kinesthetic sense, posture, motor skill, proprioception  to assist with LE, proximal hip, and core control in self care, mobility, lifting, ambulation and eccentric single leg control.   [] (65800) Therapist is in constant attendance of 2 or more patients providing skilled therapy interventions, but not providing any significant amount of measurable one-on-one time to either patient, for improvements in LE, proximal hip, and core control in self care, mobility, lifting, ambulation and eccentric single leg control.      NMR and Therapeutic Activities:    [x] (59660 or 36772) Provided verbal/tactile cueing for activities related to improving balance, coordination, kinesthetic sense, posture, motor skill, proprioception and motor activation to allow for proper function of core, proximal hip and LE with self care and ADLs  [] (20249) Gait Re-education- Provided training and instruction to the patient for proper LE, core and proximal hip recruitment and positioning and eccentric body weight control with ambulation re-education including up and down stairs     Home Exercise Program:    [x] (82180) Reviewed/Progressed HEP activities related to strengthening, flexibility, endurance, ROM of core, proximal hip and LE for functional self-care, mobility, lifting and ambulation/stair navigation   [] (68260)Reviewed/Progressed HEP activities related to improving balance, coordination, kinesthetic sense, posture, motor skill, proprioception of core, proximal hip and LE for self care, mobility, lifting, and ambulation/stair navigation      Manual Treatments:  PROM / STM / Oscillations-Mobs:  G-I, II, III, IV (PA's, Inf., Post.)  [] (20096) Provided manual therapy to mobilize LE, proximal hip and/or LS spine soft tissue/joints for the purpose of modulating pain, promoting relaxation,  increasing ROM, reducing/eliminating soft tissue swelling/inflammation/restriction, improving soft tissue extensibility and allowing for proper ROM for normal function with self care, mobility, lifting and ambulation. Modalities:  [] (24761) Vasopneumatic compression: Utilized vasopneumatic compression to decrease edema / swelling for the purpose of improving mobility and quad tone / recruitment which will allow for increased overall function including but not limited to self-care, transfers, ambulation, and ascending / descending stairs. Charges:  Timed Code Treatment Minutes: 45   Total Treatment Minutes: 60     Units approved Units used Date Range   100 units: TE, TA, NR, MAN, VASO  3 TE  3 TA  4 NR  MAN  3 VASO 3/7/23-6/10/23     [] EVAL - LOW (42859)   [] EVAL - MOD (12687)  [] EVAL - HIGH (49854)  [] RE-EVAL (38922)  [x] YH(26899) x   1    [] Ionto  [x] NMR (96823) x 2      [x] Vaso  [] Manual (59731) x       [] Ultrasound  [] TA x  1      [] Mech Traction (16814)  [] Aquatic Therapy x     [] ES (un) (96383):   [] Home Management Training x  [] ES(attended) (64131)   [] Dry Needling 1-2 muscles (78777):  [] Dry Needling 3+ muscles (895183  [] Group:       []Other:     GOALS:  Patient stated goal: \"gym, drive\"  [] Progressing: [] Met: [] Not Met: [] Adjusted    Therapist goals for Patient:   Short Term Goals: To be achieved in: 2 weeks  1. Independent in HEP and progression per patient tolerance, in order to prevent re-injury. [] Progressing: [] Met: [] Not Met: [] Adjusted  2. Patient will have a decrease in pain to facilitate improvement in movement, function, and ADLs as indicated by Functional Deficits. [] Progressing: [] Met: [] Not Met: [] Adjusted    Long Term Goals: To be achieved in: 10 weeks  1.  Pt will improve LEFS by 9 points to reduce disability and progress towards PLOF.   [] Progressing: [] Met: [] Not Met: [] Adjusted  2. Patient will demonstrate increased R ankle AROM to improve DF to 0, other ROM by 5 degrees to allow for proper joint functioning as indicated by patients Functional Deficits. [] Progressing: [] Met: [] Not Met: [] Adjusted  3. Patient will demonstrate an increase in R ankle Strength to at least 5/5 as well as good proximal hip strength and control to allow for proper functional mobility as indicated by patients Functional Deficits. [] Progressing: [] Met: [] Not Met: [] Adjusted  4. Patient will return to functional activities including ascending/descending at least 10 stairs reciprocally with at least one handrail without increased symptoms or restriction. [] Progressing: [] Met: [] Not Met: [] Adjusted  5. Patient will be able to demonstrate improved balance by performing SLS on RLE for at least 20 seconds independently barefoot on floor. [] Progressing: [] Met: [] Not Met: [] Adjusted    Overall Progression Towards Functional goals/ Treatment Progress Update:  [] Patient is progressing as expected towards functional goals listed. [] Progression is slowed due to complexities/Impairments listed. [] Progression has been slowed due to co-morbidities. [x] Plan just implemented, too soon to assess goals progression <30days   [] Goals require adjustment due to lack of progress  [] Patient is not progressing as expected and requires additional follow up with physician  [] Other    Persisting Functional Limitations/Impairments:  []Sitting [x]Standing   [x]Walking [x]Stairs   [x]Transfers [x]ADLs   [x]Squatting/bending [x]Kneeling  [x]Housework [x]Job related tasks  [x]Driving [x]Sports/Recreation   []Sleeping []Other:    ASSESSMENT:  Pt with good tolerance to session with increased weight bearing without AD. Pt able to ambulate throughout clinic without crutch.  Pt with increased antalgia without crutch as demo'd by decreased RLE stance time and heel-toe rocker foot pattern and increased R foot eversion. Pt with improved gait mechanics on Alter G with cuing to improve heel-toe rocker pattern and push through big toe to decrease R foot eversion. Pt awaiting confirmation of details for Jordan's. Pt not yet able to tolerate lateral step downs, but able to complete step ups. Pt educated to progress at home with ascending stairs and slowly progressing descending laterally per pt tolerance and focus on safety. Cont to progress mobility and function per pt tolerance to full WB activities. Treatment/Activity Tolerance:  [] Pt able to complete treatment [] Patient limited by fatique  [x] Patient limited by pain  [] Patient limited by other medical complications  [] Other:     Prognosis:  [x] Good [] Fair  [] Poor    Patient Requires Follow-up: [x] Yes  [] No    Return to Play:    [x]  N/A   [x]  Stage 1: Intro to Strength   []  Stage 2: Return to Run and Strength   []  Stage 3: Return to Jump and Strength   []  Stage 4: Dynamic Strength and Agility   []  Stage 5: Sport Specific Training     []  Ready to Return to Play, Meets All Above Stages   []  Not Ready for Return to Sports   Comments:            PLAN: See eval. PT 1-2x / week for 10 weeks. [x] Continue per plan of care [] Alter current plan (see comments)  [] Plan of care initiated [] Hold pending MD visit [] Discharge    Electronically signed by: Ruthy Conn PT, DPT      Note: If patient does not return for scheduled/ recommended follow up visits, this note will serve as a discharge from care along with most recent update on progress.

## 2023-03-21 ENCOUNTER — HOSPITAL ENCOUNTER (OUTPATIENT)
Dept: PHYSICAL THERAPY | Age: 33
Setting detail: THERAPIES SERIES
End: 2023-03-21
Payer: COMMERCIAL

## 2023-03-23 ENCOUNTER — HOSPITAL ENCOUNTER (OUTPATIENT)
Dept: PHYSICAL THERAPY | Age: 33
Setting detail: THERAPIES SERIES
Discharge: HOME | End: 2023-03-23
Payer: COMMERCIAL

## 2023-03-23 PROCEDURE — 97016 VASOPNEUMATIC DEVICE THERAPY: CPT

## 2023-03-23 PROCEDURE — 97110 THERAPEUTIC EXERCISES: CPT

## 2023-03-23 PROCEDURE — 97112 NEUROMUSCULAR REEDUCATION: CPT

## 2023-03-23 NOTE — FLOWSHEET NOTE
facilitate improvement in movement, function, and ADLs as indicated by Functional Deficits. [] Progressing: [] Met: [] Not Met: [] Adjusted    Long Term Goals: To be achieved in: 10 weeks  1. Pt will improve LEFS by 9 points to reduce disability and progress towards PLOF. [] Progressing: [] Met: [] Not Met: [] Adjusted  2. Patient will demonstrate increased R ankle AROM to improve DF to 0, other ROM by 5 degrees to allow for proper joint functioning as indicated by patients Functional Deficits. [] Progressing: [] Met: [] Not Met: [] Adjusted  3. Patient will demonstrate an increase in R ankle Strength to at least 5/5 as well as good proximal hip strength and control to allow for proper functional mobility as indicated by patients Functional Deficits. [] Progressing: [] Met: [] Not Met: [] Adjusted  4. Patient will return to functional activities including ascending/descending at least 10 stairs reciprocally with at least one handrail without increased symptoms or restriction. [] Progressing: [] Met: [] Not Met: [] Adjusted  5. Patient will be able to demonstrate improved balance by performing SLS on RLE for at least 20 seconds independently barefoot on floor. [] Progressing: [] Met: [] Not Met: [] Adjusted    Overall Progression Towards Functional goals/ Treatment Progress Update:  [] Patient is progressing as expected towards functional goals listed. [] Progression is slowed due to complexities/Impairments listed. [] Progression has been slowed due to co-morbidities.   [x] Plan just implemented, too soon to assess goals progression <30days   [] Goals require adjustment due to lack of progress  [] Patient is not progressing as expected and requires additional follow up with physician  [] Other    Persisting Functional Limitations/Impairments:  []Sitting [x]Standing   [x]Walking [x]Stairs   [x]Transfers [x]ADLs   [x]Squatting/bending [x]Kneeling  [x]Housework [x]Job related

## 2023-03-27 ENCOUNTER — APPOINTMENT (OUTPATIENT)
Dept: PHYSICAL THERAPY | Age: 33
End: 2023-03-27
Payer: COMMERCIAL

## 2023-03-28 ENCOUNTER — HOSPITAL ENCOUNTER (OUTPATIENT)
Dept: PHYSICAL THERAPY | Age: 33
Setting detail: THERAPIES SERIES
Discharge: HOME | End: 2023-03-28
Payer: COMMERCIAL

## 2023-03-28 PROCEDURE — 97016 VASOPNEUMATIC DEVICE THERAPY: CPT

## 2023-03-28 PROCEDURE — 97110 THERAPEUTIC EXERCISES: CPT

## 2023-03-28 PROCEDURE — 97112 NEUROMUSCULAR REEDUCATION: CPT

## 2023-03-28 NOTE — FLOWSHEET NOTE
10 reps  Standing March with Counter Support - 1 x daily - 7 x weekly - 3 sets - 10 reps  Standing Hip Extension with Counter Support - 1 x daily - 7 x weekly - 3 sets - 10 reps  Standing Hip Abduction with Counter Support - 1 x daily - 7 x weekly - 3 sets - 10 reps    Therapeutic Exercise and NMR EXR  [x] (05520) Provided verbal/tactile cueing for activities related to strengthening, flexibility, endurance, ROM for improvements in LE, proximal hip, and core control with self care, mobility, lifting, ambulation.  [] (55948) Provided verbal/tactile cueing for activities related to improving balance, coordination, kinesthetic sense, posture, motor skill, proprioception  to assist with LE, proximal hip, and core control in self care, mobility, lifting, ambulation and eccentric single leg control.   [] (67606) Therapist is in constant attendance of 2 or more patients providing skilled therapy interventions, but not providing any significant amount of measurable one-on-one time to either patient, for improvements in LE, proximal hip, and core control in self care, mobility, lifting, ambulation and eccentric single leg control.      NMR and Therapeutic Activities:    [x] (63672 or 48263) Provided verbal/tactile cueing for activities related to improving balance, coordination, kinesthetic sense, posture, motor skill, proprioception and motor activation to allow for proper function of core, proximal hip and LE with self care and ADLs  [] (16407) Gait Re-education- Provided training and instruction to the patient for proper LE, core and proximal hip recruitment and positioning and eccentric body weight control with ambulation re-education including up and down stairs     Home Exercise Program:    [x] (86609) Reviewed/Progressed HEP activities related to strengthening, flexibility, endurance, ROM of core, proximal hip and LE for functional self-care, mobility, lifting and ambulation/stair navigation   []

## 2023-03-31 ENCOUNTER — HOSPITAL ENCOUNTER (OUTPATIENT)
Dept: PHYSICAL THERAPY | Age: 33
Setting detail: THERAPIES SERIES
End: 2023-03-31
Payer: COMMERCIAL

## 2023-03-31 NOTE — FLOWSHEET NOTE
[address]     Physical Therapy  Cancellation/No-show Note  Patient Name:  Lisa Ch  :  1990   Date:  3/31/2023  Cancelled visits to date: 1  No-shows to date: 0    Patient status for today's appointment patient:  [x]  Cancelled: 3/31  []  Rescheduled appointment  []  No-show     Reason given by patient:  [x]  Patient ill  []  Conflicting appointment  []  No transportation    []  Conflict with work  []  No reason given  []  Other:     Comments:      Phone call information:   []  Phone call made today to patient at _ time at number provided:      []  Patient answered, conversation as follows:    []  Patient did not answer, message left as follows:  []  Phone call not made today  [x]  Phone call not needed - pt contacted us to cancel and provided reason for cancellation.      Electronically signed by:  Byron De La Rosa, PT, DPT

## 2023-04-03 ENCOUNTER — HOSPITAL ENCOUNTER (OUTPATIENT)
Dept: PHYSICAL THERAPY | Age: 33
Setting detail: THERAPIES SERIES
Discharge: HOME | End: 2023-04-03
Payer: COMMERCIAL

## 2023-04-03 NOTE — FLOWSHEET NOTE
[address]     Physical Therapy  Cancellation/No-show Note  Patient Name:  Meredeth Bence  :  1990   Date:  4/3/2023  Cancelled visits to date: 2  No-shows to date: 0    Patient status for today's appointment patient:  [x]  Cancelled: 3/31, 4/3  []  Rescheduled appointment  []  No-show     Reason given by patient:  [x]  Patient ill - dtr sick  []  Conflicting appointment  []  No transportation    []  Conflict with work  []  No reason given  []  Other:     Comments:      Phone call information:   []  Phone call made today to patient at _ time at number provided:      []  Patient answered, conversation as follows:    []  Patient did not answer, message left as follows:  []  Phone call not made today  [x]  Phone call not needed - pt contacted us to cancel and provided reason for cancellation.      Electronically signed by:  Laureano Kang, PT, DPT

## 2023-04-07 ENCOUNTER — HOSPITAL ENCOUNTER (OUTPATIENT)
Dept: PHYSICAL THERAPY | Age: 33
Setting detail: THERAPIES SERIES
Discharge: HOME | End: 2023-04-07
Payer: COMMERCIAL

## 2023-04-07 PROCEDURE — 97112 NEUROMUSCULAR REEDUCATION: CPT

## 2023-04-07 PROCEDURE — 97110 THERAPEUTIC EXERCISES: CPT

## 2023-04-07 PROCEDURE — 97016 VASOPNEUMATIC DEVICE THERAPY: CPT

## 2023-04-07 NOTE — PROGRESS NOTES
MD f/u, AD usage, compression. 3/23                        Neuromuscular Re-ed (70752)       Alter G: size XL  Height of cock pit at level 10  X 10 min 0-4% incline  1.3 mph  70% BW 3/10, 3/14: progressed BW%. 3/16: cued for heel-toe rocker w/ dec eversion. 3/23: cued to push through big toe. 3/28: added incline, ^ BW   Alter G: heel raises  Alter G: mini squats 3/14. 3/28: resumed   tandem floor 15\" 1 R post 3/10. 3/23: int UE support   NBOS       SLS    3/10: not yet reyes   Weight shift: m/l, a/p  3/10: BUE support   LSD 4\" 2 10 R 3/16: not yet reyes. 4/7: inc sets/reps   Forward lunge on step MWM 8\" 5\" 15 R 3/23   [employer]: Maze Control Training 3/23:    Bio[employer]ex: Limits of Stability 3/23:    Step up/over wedge incline/decline  3/28                        Manual Intervention (24542)       Knee mobs/PROM       Tib/Fem Mobs       Patella Mobs       Ankle mobs                         Modalities:   vasopneumatic compression for edema to R ankle in long sitting  Date: Pre-vaso (cm) Post-vaso (cm) Pressure Time   3/10 53.5 53.0 low 10 min   3/14 53.7 53.0 mod 15 min   3/16 53.2 53.0 mod 15 min   3/23 54 53.2 mod 10 min   3/28 55 53.7 mod 15 min   4/7 53.5 52.3 Mod  15 min        Pt. Education:  03/07/2023  -pt educated on diagnosis, prognosis and expectations for rehab  -all pt questions were answered  -educated on LE machines to use at gym: leg ext, curl; hip abd/add, bike  3/23: educated on using heat vs. Ice for swelling, elevation at work      Home Exercise Program:  Access Code: [email or web address]  URL: [email or web address]  Date: 03/07/2023  Prepared by: Dada Ross    Exercises  Long Sitting Soleus Stretch on Bolster with Strap - 1-4 x daily - 7 x weekly - 1 sets - 3-5 reps - 20-30 seconds hold  Long Sitting Calf Stretch with Strap - 1-4 x daily - 7 x weekly - 1 sets - 3-5 reps - 20-30 seconds hold  Long Sitting Ankle Eversion with Resistance - 1 x daily - 7 x weekly - 3 sets - 10 reps  Long Sitting Ankle Inversion with
No respiratory distress. No stridor, Lungs sounds clear with good aeration bilaterally.

## 2023-04-17 ENCOUNTER — APPOINTMENT (OUTPATIENT)
Dept: PHYSICAL THERAPY | Age: 33
End: 2023-04-17
Payer: COMMERCIAL

## 2023-04-28 ENCOUNTER — HOSPITAL ENCOUNTER (OUTPATIENT)
Dept: PHYSICAL THERAPY | Age: 33
Setting detail: THERAPIES SERIES
Discharge: HOME | End: 2023-04-28
Payer: COMMERCIAL

## 2023-05-01 ENCOUNTER — HOSPITAL ENCOUNTER (OUTPATIENT)
Dept: PHYSICAL THERAPY | Age: 33
Setting detail: THERAPIES SERIES
Discharge: HOME | End: 2023-05-01
Payer: COMMERCIAL

## 2023-05-01 NOTE — PROGRESS NOTES
[address]     Physical Therapy  Cancellation/No-show Note  Patient Name:  Lee Cunningham  :  1990   Date:  2023  Cancelled visits to date: 5  No-shows to date: 0    Patient status for today's appointment patient:  [x]  Cancelled: 3/31, 4/3, ,   []  Rescheduled appointment  []  No-show     Reason given by patient:  []  Patient ill   []  Conflicting appointment  []  No transportation    []  Conflict with work  [x]  No reason given  []  Other:     Comments:      Phone call information:   []  Phone call made today to patient at _ time at number provided:      []  Patient answered, conversation as follows:    []  Patient did not answer, message left as follows:  []  Phone call not made today  [x]  Phone call not needed - pt contacted us to cancel but provided no reason for cancellation.      Electronically signed by:  Brayan Hendrix, PT, DPT

## 2023-05-04 ENCOUNTER — HOSPITAL ENCOUNTER (OUTPATIENT)
Dept: PHYSICAL THERAPY | Age: 33
Setting detail: THERAPIES SERIES
Discharge: HOME | End: 2023-05-04
Payer: COMMERCIAL

## 2023-05-04 PROCEDURE — 97110 THERAPEUTIC EXERCISES: CPT

## 2023-05-04 PROCEDURE — 97112 NEUROMUSCULAR REEDUCATION: CPT

## 2023-05-04 PROCEDURE — 97016 VASOPNEUMATIC DEVICE THERAPY: CPT

## 2023-05-04 PROCEDURE — 97530 THERAPEUTIC ACTIVITIES: CPT

## 2023-05-04 NOTE — PROGRESS NOTES
[address] [place], [place] [address]michelaia Her  Phone: [phone]   Fax: [phone]    Physical Therapy Daily Treatment Note    Date:  2023     Patient Name:  Agus Wu    :  1990  MRN: [id number]  Medical Diagnosis:  E54.639C (ICD-10-CM) - Displaced trimalleolar fracture of right lower leg, subsequent encounter for open fracture type I or II with routine healing  Treatment Diagnosis: decreased R ankle ROM, strength, antalgic gait     Insurance/Certification information:  PT Insurance Information: [facility]  Physician Information:  Andressa Carlin MD    Plan of care signed (Y/N): [x]  Yes []  No     Date of Patient follow up with Physician: around 23     Progress Report: [x]  Yes  []  No     Date Range for reporting period:  Beginning: 3/7/2023  PN: 23, 23  Ending:     Progress report due (10 Rx/or 30 days whichever is less): visit #47 or POC      Recertification due (POC duration/ or 90 days whichever is less): visit #20 or 23     Visit # Insurance Allowable Auth required? Date Range    30/yr - soft max [x]  Yes  []  No 3/7/23-6/10/23     Latex Allergy:  [x]NO      []YES  Preferred Language for Healthcare:   [x]English       []other:    Functional Scale:       Date assessed:  LEFS: raw score = 33; dysfunction = 59%  3/7/23  LEFS: raw score = 45; dysfunction = 44%  23  LEFS: raw score = 51; dysfunction = 36%  23    Pain level:  0/10 in NWB, 4/10 WB, walking     SUBJECTIVE:  Pt reports she's walking better and tried jogging on vacation a little bit but it did not go well. Pt states she struggles with speed when walking. Pt reports 75% improvement since starting therapy. States the cruise was fun, but she had a really hard time, she was standing more and her ankle was always swollen.        OBJECTIVE:   23, 23: changes noted in bold  R SLS: 8 seconds   PROM AROM     L R L R   Dorsiflexion      1 4   Plantarflexion      61 57

## 2023-05-08 ENCOUNTER — HOSPITAL ENCOUNTER (OUTPATIENT)
Dept: PHYSICAL THERAPY | Age: 33
Setting detail: THERAPIES SERIES
Discharge: HOME | End: 2023-05-08
Payer: COMMERCIAL

## 2023-05-08 PROCEDURE — 97112 NEUROMUSCULAR REEDUCATION: CPT

## 2023-05-08 PROCEDURE — 97110 THERAPEUTIC EXERCISES: CPT

## 2023-05-08 PROCEDURE — 97016 VASOPNEUMATIC DEVICE THERAPY: CPT

## 2023-05-08 NOTE — FLOWSHEET NOTE
[address] [place] [address] Chris Escobar  Phone: [phone]   Fax: [phone]    Physical Therapy Daily Treatment Note    Date:  2023     Patient Name:  Nolan Gonzalez    :  1990  MRN: [id number]  Medical Diagnosis:  Q29.245C (ICD-10-CM) - Displaced trimalleolar fracture of right lower leg, subsequent encounter for open fracture type I or II with routine healing  Treatment Diagnosis: decreased R ankle ROM, strength, antalgic gait     Insurance/Certification information:  PT Insurance Information: [facility]  Physician Information:  Tommie Rajan MD    Plan of care signed (Y/N): [x]  Yes []  No     Date of Patient follow up with Physician: around 23     Progress Report: []  Yes  [x]  No     Date Range for reporting period:  Beginning: 3/7/2023  PN: 23, 23  Ending:     Progress report due (10 Rx/or 30 days whichever is less): visit #07 or POC      Recertification due (POC duration/ or 90 days whichever is less): visit #20 or 23     Visit # Insurance Allowable Auth required? Date Range   10/20 30/yr - soft max [x]  Yes  []  No 3/7/23-6/10/23     Latex Allergy:  [x]NO      []YES  Preferred Language for Healthcare:   [x]English       []other:    Functional Scale:       Date assessed:  LEFS: raw score = 33; dysfunction = 59%  3/7/23  LEFS: raw score = 45; dysfunction = 44%  23  LEFS: raw score = 51; dysfunction = 36%  23    Pain level:  0/10 in NWB, 4/10 WB, walking     SUBJECTIVE:  Pt reports she has not been able to make it to [facility] yet d/t other priorities. Pt feels her R ankle is more stiff into DF this date d/t not being in therapy as much.        OBJECTIVE:   23, 23: changes noted in bold  R SLS: 8 seconds   PROM AROM     L R L R   Dorsiflexion      1 4   Plantarflexion      61 57   Inversion      33 46   Eversion      20 22         Strength (0-5) / Myotomes Left Right   Hip Flexion - supine       Hip Flexion - seated (L1-2) 5 5

## 2023-05-22 ENCOUNTER — HOSPITAL ENCOUNTER (OUTPATIENT)
Dept: PHYSICAL THERAPY | Age: 33
Setting detail: THERAPIES SERIES
Discharge: HOME | End: 2023-05-22
Payer: COMMERCIAL

## 2023-05-22 NOTE — PROGRESS NOTES
[address]     Physical Therapy  Cancellation/No-show Note  Patient Name:  Gamal Rousseau  :  1990   Date:  2023  Cancelled visits to date: 4  No-shows to date: 0    Patient status for today's appointment patient:  [x]  Cancelled: 3/31, 4/3, ,   []  Rescheduled appointment  []  No-show     Reason given by patient:  []  Patient ill   []  Conflicting appointment  []  No transportation    []  Conflict with work  []  No reason given  [x]  Other:     Comments:  has to study for a test for school    Phone call information:   []  Phone call made today to patient at _ time at number provided:      []  Patient answered, conversation as follows:    []  Patient did not answer, message left as follows:  []  Phone call not made today  [x]  Phone call not needed - pt contacted us to cancel and provided reason for cancellation.      Electronically signed by:  Arnoldo Garza, PT, DPT

## 2023-06-05 ENCOUNTER — HOSPITAL ENCOUNTER (OUTPATIENT)
Dept: PHYSICAL THERAPY | Age: 33
Setting detail: THERAPIES SERIES
Discharge: HOME | End: 2023-06-05

## 2023-06-05 NOTE — PROGRESS NOTES
[address]     Physical Therapy  Cancellation/No-show Note  Patient Name:  Tika Perales  :  1990   Date:  2023  Cancelled visits to date: 5  No-shows to date: 0    Patient status for today's appointment patient:  [x]  Cancelled: 3/31, 4/3, , ,   []  Rescheduled appointment  []  No-show     Reason given by patient:  []  Patient ill   []  Conflicting appointment  []  No transportation    []  Conflict with work  []  No reason given  [x]  Other:     Comments: wants to discharge    Phone call information:   []  Phone call made today to patient at _ time at number provided:      []  Patient answered, conversation as follows:    []  Patient did not answer, message left as follows:  []  Phone call not made today  [x]  Phone call not needed - pt contacted us to cancel and provided reason for cancellation.      Electronically signed by:  Em Rascon, PT, DPT

## 2024-09-02 ENCOUNTER — OFFICE VISIT (OUTPATIENT)
Age: 34
End: 2024-09-02

## 2024-09-02 VITALS
DIASTOLIC BLOOD PRESSURE: 85 MMHG | TEMPERATURE: 98.5 F | HEART RATE: 97 BPM | SYSTOLIC BLOOD PRESSURE: 129 MMHG | OXYGEN SATURATION: 97 % | RESPIRATION RATE: 18 BRPM

## 2024-09-02 DIAGNOSIS — U07.1 COVID-19: ICD-10-CM

## 2024-09-02 DIAGNOSIS — R09.81 SINUS CONGESTION: Primary | ICD-10-CM

## 2024-09-02 LAB
INFLUENZA A ANTIGEN, POC: NEGATIVE
INFLUENZA B ANTIGEN, POC: NEGATIVE
Lab: ABNORMAL
QC PASS/FAIL: ABNORMAL
SARS-COV-2 RDRP RESP QL NAA+PROBE: POSITIVE

## 2024-09-02 RX ORDER — DEXTROMETHORPHAN HYDROBROMIDE AND PROMETHAZINE HYDROCHLORIDE 15; 6.25 MG/5ML; MG/5ML
5 SYRUP ORAL 4 TIMES DAILY PRN
Qty: 118 ML | Refills: 0 | Status: SHIPPED | OUTPATIENT
Start: 2024-09-02 | End: 2024-09-09

## 2024-09-02 ASSESSMENT — ENCOUNTER SYMPTOMS
ABDOMINAL PAIN: 0
VOMITING: 0
COUGH: 1
DIARRHEA: 0
SHORTNESS OF BREATH: 0
SORE THROAT: 1
RHINORRHEA: 1
BACK PAIN: 0
NAUSEA: 0

## 2024-09-02 NOTE — PATIENT INSTRUCTIONS
New Prescriptions    PROMETHAZINE-DEXTROMETHORPHAN (PROMETHAZINE-DM) 6.25-15 MG/5ML SYRUP    Take 5 mLs by mouth 4 times daily as needed for Cough      Take tylenol and/or ibuprofen for pain/fever relief.  Encourage rest and increase fluid intake.  Take the cough medicine as needed for symptom relief.  Follow-up with your PCP as needed.  Return for severe/worsening symptoms.   (3) no apparent problem

## 2024-09-02 NOTE — PROGRESS NOTES
breath.        Vitals:    09/02/24 1535   BP: 129/85   Pulse: 97   Resp: 18   Temp: 98.5 °F (36.9 °C)   SpO2: 97%        Review of Systems   Constitutional:  Positive for fatigue. Negative for chills and fever.   HENT:  Positive for congestion, postnasal drip, rhinorrhea and sore throat. Negative for ear pain.    Respiratory:  Positive for cough. Negative for shortness of breath.    Cardiovascular:  Negative for chest pain.   Gastrointestinal:  Negative for abdominal pain, diarrhea, nausea and vomiting.   Genitourinary:  Negative for urgency.   Musculoskeletal:  Positive for myalgias. Negative for back pain and neck pain.   Skin:  Negative for rash.   Neurological:  Positive for headaches. Negative for syncope.       Objective   Physical Exam  Constitutional:       Appearance: She is not ill-appearing or toxic-appearing.   HENT:      Right Ear: Tympanic membrane normal.      Left Ear: Tympanic membrane normal.      Nose: Nose normal.      Mouth/Throat:      Mouth: Mucous membranes are moist.      Pharynx: Oropharynx is clear. No pharyngeal swelling, oropharyngeal exudate or posterior oropharyngeal erythema.      Comments: No erythema, swelling, or exudate to pharynx  Eyes:      Pupils: Pupils are equal, round, and reactive to light.   Cardiovascular:      Rate and Rhythm: Normal rate and regular rhythm.      Pulses: Normal pulses.      Heart sounds: Normal heart sounds.   Pulmonary:      Effort: Pulmonary effort is normal.      Breath sounds: Normal breath sounds.      Comments: Lung sounds clear throughout  Abdominal:      Palpations: Abdomen is soft.      Tenderness: There is no abdominal tenderness.   Musculoskeletal:         General: Normal range of motion.   Skin:     General: Skin is warm and dry.          An electronic signature was used to authenticate this note.    --Marie Jara, AIXA - CNP

## 2024-12-27 ENCOUNTER — OFFICE VISIT (OUTPATIENT)
Age: 34
End: 2024-12-27

## 2024-12-27 VITALS
HEART RATE: 90 BPM | HEIGHT: 64 IN | OXYGEN SATURATION: 97 % | WEIGHT: 138.8 LBS | SYSTOLIC BLOOD PRESSURE: 120 MMHG | BODY MASS INDEX: 23.7 KG/M2 | DIASTOLIC BLOOD PRESSURE: 79 MMHG | TEMPERATURE: 98.2 F

## 2024-12-27 DIAGNOSIS — H66.91 RIGHT OTITIS MEDIA, UNSPECIFIED OTITIS MEDIA TYPE: Primary | ICD-10-CM

## 2024-12-27 DIAGNOSIS — B37.31 CANDIDA VAGINITIS: ICD-10-CM

## 2024-12-27 RX ORDER — FLUCONAZOLE 150 MG/1
TABLET ORAL
Qty: 2 TABLET | Refills: 1 | Status: SHIPPED | OUTPATIENT
Start: 2024-12-27

## 2024-12-27 RX ORDER — AMOXICILLIN 500 MG/1
500 CAPSULE ORAL 2 TIMES DAILY
Qty: 20 CAPSULE | Refills: 0 | Status: SHIPPED | OUTPATIENT
Start: 2024-12-27 | End: 2025-01-06

## 2024-12-27 NOTE — PATIENT INSTRUCTIONS
Harinder,    Thank you for trusting [facility] with your health care needs. Your decision to come to us means a lot, and we are honored to be part of your healthcare journey.  At [facility], our dedicated team is committed to providing you with the highest quality of care in a warm and welcoming environment. Your health and well-being are our top priorities, and we appreciate the opportunity to serve you.    Thank you for choosing us, and we’re here for you whenever you need us!    Warm regards,       [facility] Team    [] Dr. Espinoza [] JOSÉ ANTONIO Peña, Supervisor       [] CARLOS Weber    [] RT Jessi    [] JOSÉ ANTONIO Moreno    [] JOSÉ ANTONIO Diaz  [] JOSÉ ANTONIO Diaz   [] JOSÉ ANTONIO Santillan    [] JOSÉ ANTONIO Vides

## 2024-12-28 NOTE — PROGRESS NOTES
Harinder Moraes (: 1990) is a 34 y.o. female, New patient, here for evaluation of the following chief complaint(s):  Ear Pain (Pt here for Rt ear pain started today.)      ASSESSMENT/PLAN:    ICD-10-CM    1. Right otitis media, unspecified otitis media type  H66.91 amoxicillin (AMOXIL) 500 MG capsule      2. Candida vaginitis  B37.31 fluconazole (DIFLUCAN) 150 MG tablet            Discussed PCP follow up for persisting or worsening symptoms, or to return to the clinic if unable to obtain PCP follow up for worsening symptoms.    The patient tolerated their visit well. The patient and/or the family were informed of the results of any tests, a time was given to answer questions, a plan was proposed and they agreed with plan. Reviewed AVS with treatment instructions and answered questions - pt/family expresses understanding and agreement with the discussed treatment plan and AVS instructions.      SUBJECTIVE/OBJECTIVE:  HPI     Ear Pain     Additional comments: Pt here for Rt ear pain started today.          Last edited by oJey Garcia MA on 2024  6:25 PM.    \      Ear Pain         VITAL SIGNS  Vitals:    24 1825   BP: 120/79   Pulse: 90   Temp: 98.2 °F (36.8 °C)   TempSrc: Oral   SpO2: 97%   Weight: 63 kg (138 lb 12.8 oz)   Height: 1.626 m (5' 4\")       Review of Systems   HENT:  Positive for ear pain.      See HPI for pertinent positives and negatives.    Physical Exam  Constitutional:       General: She is not in acute distress.     Appearance: Normal appearance.   HENT:      Head: Normocephalic and atraumatic.      Right Ear: Ear canal normal.      Left Ear: Tympanic membrane and ear canal normal.      Ears:      Comments: Right tympanic erythema.     Nose: Nose normal.      Mouth/Throat:      Mouth: Mucous membranes are moist.   Eyes:      Pupils: Pupils are equal, round, and reactive to light.   Cardiovascular:      Rate and Rhythm: Normal rate.   Pulmonary:      Effort: Pulmonary effort is

## (undated) DEVICE — PROCEDURE KIT ENDOSCP CUST

## (undated) DEVICE — CANNULA SAMP CO2 AD GRN 7FT CO2 AND 7FT O2 TBNG UNIV CONN

## (undated) DEVICE — TRAP SPEC RETRV CLR PLAS POLYP IN LN SUCT QUIK CTCH

## (undated) DEVICE — SNARE COLD DIAMOND 10MM THIN

## (undated) DEVICE — BW-412T DISP COMBO CLEANING BRUSH: Brand: SINGLE USE COMBINATION CLEANING BRUSH

## (undated) DEVICE — SOLUTION IV IRRIG WATER 500ML POUR BRL ST 2F7113